# Patient Record
Sex: FEMALE | Race: WHITE | NOT HISPANIC OR LATINO
[De-identification: names, ages, dates, MRNs, and addresses within clinical notes are randomized per-mention and may not be internally consistent; named-entity substitution may affect disease eponyms.]

---

## 2017-02-03 ENCOUNTER — APPOINTMENT (OUTPATIENT)
Dept: FAMILY MEDICINE | Facility: CLINIC | Age: 22
End: 2017-02-03

## 2017-02-03 VITALS
OXYGEN SATURATION: 99 % | DIASTOLIC BLOOD PRESSURE: 80 MMHG | HEART RATE: 97 BPM | BODY MASS INDEX: 39.14 KG/M2 | SYSTOLIC BLOOD PRESSURE: 122 MMHG | WEIGHT: 289 LBS | HEIGHT: 72 IN

## 2017-06-07 ENCOUNTER — RX RENEWAL (OUTPATIENT)
Age: 22
End: 2017-06-07

## 2017-06-14 ENCOUNTER — MEDICATION RENEWAL (OUTPATIENT)
Age: 22
End: 2017-06-14

## 2017-08-11 ENCOUNTER — APPOINTMENT (OUTPATIENT)
Dept: FAMILY MEDICINE | Facility: CLINIC | Age: 22
End: 2017-08-11

## 2017-12-15 ENCOUNTER — APPOINTMENT (OUTPATIENT)
Dept: FAMILY MEDICINE | Facility: CLINIC | Age: 22
End: 2017-12-15

## 2018-08-25 ENCOUNTER — TRANSCRIPTION ENCOUNTER (OUTPATIENT)
Age: 23
End: 2018-08-25

## 2019-05-11 ENCOUNTER — APPOINTMENT (OUTPATIENT)
Dept: OBGYN | Facility: CLINIC | Age: 24
End: 2019-05-11

## 2019-06-03 ENCOUNTER — LABORATORY RESULT (OUTPATIENT)
Age: 24
End: 2019-06-03

## 2019-06-03 ENCOUNTER — APPOINTMENT (OUTPATIENT)
Dept: FAMILY MEDICINE | Facility: CLINIC | Age: 24
End: 2019-06-03
Payer: COMMERCIAL

## 2019-06-03 VITALS
SYSTOLIC BLOOD PRESSURE: 132 MMHG | HEART RATE: 72 BPM | WEIGHT: 288 LBS | BODY MASS INDEX: 39.01 KG/M2 | OXYGEN SATURATION: 98 % | HEIGHT: 72 IN | DIASTOLIC BLOOD PRESSURE: 78 MMHG

## 2019-06-03 PROCEDURE — G0444 DEPRESSION SCREEN ANNUAL: CPT

## 2019-06-03 PROCEDURE — 36415 COLL VENOUS BLD VENIPUNCTURE: CPT

## 2019-06-03 PROCEDURE — G0447 BEHAVIOR COUNSEL OBESITY 15M: CPT

## 2019-06-03 PROCEDURE — 99395 PREV VISIT EST AGE 18-39: CPT | Mod: 25

## 2019-06-03 RX ORDER — DOXYCYCLINE HYCLATE 50 MG/1
50 TABLET, DELAYED RELEASE ORAL
Refills: 0 | Status: COMPLETED | COMMUNITY

## 2019-06-04 LAB
25(OH)D3 SERPL-MCNC: 19.6 NG/ML
ALBUMIN SERPL ELPH-MCNC: 4.1 G/DL
ALP BLD-CCNC: 74 U/L
ALT SERPL-CCNC: 16 U/L
ANION GAP SERPL CALC-SCNC: 10 MMOL/L
AST SERPL-CCNC: 15 U/L
BASOPHILS # BLD AUTO: 0.02 K/UL
BASOPHILS NFR BLD AUTO: 0.3 %
BILIRUB SERPL-MCNC: 0.3 MG/DL
BUN SERPL-MCNC: 8 MG/DL
CALCIUM SERPL-MCNC: 9.7 MG/DL
CHLORIDE SERPL-SCNC: 108 MMOL/L
CHOLEST SERPL-MCNC: 144 MG/DL
CHOLEST/HDLC SERPL: 2.6 RATIO
CO2 SERPL-SCNC: 23 MMOL/L
CREAT SERPL-MCNC: 0.65 MG/DL
EOSINOPHIL # BLD AUTO: 0.12 K/UL
EOSINOPHIL NFR BLD AUTO: 2.1 %
ESTIMATED AVERAGE GLUCOSE: 91 MG/DL
GLUCOSE SERPL-MCNC: 89 MG/DL
HBA1C MFR BLD HPLC: 4.8 %
HCT VFR BLD CALC: 39.4 %
HDLC SERPL-MCNC: 55 MG/DL
HGB BLD-MCNC: 12.2 G/DL
IMM GRANULOCYTES NFR BLD AUTO: 0.3 %
INSULIN SERPL-MCNC: 13.3 UU/ML
LDLC SERPL CALC-MCNC: 77 MG/DL
LYMPHOCYTES # BLD AUTO: 1.52 K/UL
LYMPHOCYTES NFR BLD AUTO: 26 %
MAN DIFF?: NORMAL
MCHC RBC-ENTMCNC: 27.8 PG
MCHC RBC-ENTMCNC: 31 GM/DL
MCV RBC AUTO: 89.7 FL
MONOCYTES # BLD AUTO: 0.38 K/UL
MONOCYTES NFR BLD AUTO: 6.5 %
NEUTROPHILS # BLD AUTO: 3.78 K/UL
NEUTROPHILS NFR BLD AUTO: 64.8 %
PLATELET # BLD AUTO: 268 K/UL
POTASSIUM SERPL-SCNC: 4 MMOL/L
PROT SERPL-MCNC: 6.8 G/DL
RBC # BLD: 4.39 M/UL
RBC # FLD: 13.2 %
SODIUM SERPL-SCNC: 141 MMOL/L
T3 SERPL-MCNC: 122 NG/DL
T3FREE SERPL-MCNC: 3.29 PG/ML
T3RU NFR SERPL: 1 TBI
T4 FREE SERPL-MCNC: 1.1 NG/DL
T4 SERPL-MCNC: 5.6 UG/DL
THYROGLOB AB SERPL-ACNC: <20 IU/ML
THYROPEROXIDASE AB SERPL IA-ACNC: <10 IU/ML
TRIGL SERPL-MCNC: 59 MG/DL
TSH SERPL-ACNC: 0.95 UIU/ML
URATE SERPL-MCNC: 4.4 MG/DL
WBC # FLD AUTO: 5.84 K/UL

## 2019-06-04 NOTE — PHYSICAL EXAM
[No Acute Distress] : no acute distress [Well Nourished] : well nourished [Well Developed] : well developed [Well-Appearing] : well-appearing [Normal Sclera/Conjunctiva] : normal sclera/conjunctiva [PERRL] : pupils equal round and reactive to light [EOMI] : extraocular movements intact [Normal Outer Ear/Nose] : the outer ears and nose were normal in appearance [Normal Oropharynx] : the oropharynx was normal [No JVD] : no jugular venous distention [Supple] : supple [Thyroid Normal, No Nodules] : the thyroid was normal and there were no nodules present [No Lymphadenopathy] : no lymphadenopathy [No Respiratory Distress] : no respiratory distress  [Clear to Auscultation] : lungs were clear to auscultation bilaterally [No Accessory Muscle Use] : no accessory muscle use [Regular Rhythm] : with a regular rhythm [Normal Rate] : normal rate  [Normal S1, S2] : normal S1 and S2 [No Murmur] : no murmur heard [No Abdominal Bruit] : a ~M bruit was not heard ~T in the abdomen [No Carotid Bruits] : no carotid bruits [No Varicosities] : no varicosities [Pedal Pulses Present] : the pedal pulses are present [No Edema] : there was no peripheral edema [No Extremity Clubbing/Cyanosis] : no extremity clubbing/cyanosis [Soft] : abdomen soft [No Palpable Aorta] : no palpable aorta [Non-distended] : non-distended [Non Tender] : non-tender [No Masses] : no abdominal mass palpated [Normal Posterior Cervical Nodes] : no posterior cervical lymphadenopathy [Normal Bowel Sounds] : normal bowel sounds [No HSM] : no HSM [Normal Anterior Cervical Nodes] : no anterior cervical lymphadenopathy [No Spinal Tenderness] : no spinal tenderness [No CVA Tenderness] : no CVA  tenderness [No Joint Swelling] : no joint swelling [Grossly Normal Strength/Tone] : grossly normal strength/tone [Normal Gait] : normal gait [Coordination Grossly Intact] : coordination grossly intact [No Rash] : no rash [Deep Tendon Reflexes (DTR)] : deep tendon reflexes were 2+ and symmetric [No Focal Deficits] : no focal deficits [Normal Affect] : the affect was normal [Normal Insight/Judgement] : insight and judgment were intact

## 2019-06-04 NOTE — HISTORY OF PRESENT ILLNESS
[de-identified] : saw gyn last year\par normal  periods\par no birth control\par \par  [FreeTextEntry1] : CPE

## 2019-06-04 NOTE — COUNSELING
[Healthy eating counseling provided] : healthy eating [Weight management counseling provided] : Weight management [Low Fat Diet] : Low fat diet [Activity counseling provided] : activity [Decrease Portions] : Decrease food portions [ - Behavioral Counseling for Obesity (Face-to-Face for 15 Minutes)] : Behavioral Counseling for Obesity (Face-to-Face for 15 Minutes) [ - Annual Depression Screening] : Annual Depression Screening

## 2019-06-04 NOTE — HEALTH RISK ASSESSMENT
[Good] : ~his/her~  mood as  good [0] : 1) Little interest or pleasure doing things: Not at all (0) [] : No [de-identified] : social 2-3 drinkls per week [[id number]] : 0

## 2019-06-04 NOTE — ASSESSMENT
[FreeTextEntry1] : >10 minutes spent on obesity counseling\par \par recommend nutriotion consult\par low carb diet\par continue exercise\par follow up in three motnhs\par \par r/o PCOs \par consider metformin/endo consult

## 2019-06-04 NOTE — HISTORY OF PRESENT ILLNESS
[de-identified] : saw gyn last year\par normal  periods\par no birth control\par \par  [FreeTextEntry1] : CPE

## 2019-06-04 NOTE — PHYSICAL EXAM
[No Acute Distress] : no acute distress [Well Nourished] : well nourished [Well Developed] : well developed [Well-Appearing] : well-appearing [Normal Sclera/Conjunctiva] : normal sclera/conjunctiva [PERRL] : pupils equal round and reactive to light [EOMI] : extraocular movements intact [Normal Outer Ear/Nose] : the outer ears and nose were normal in appearance [Normal Oropharynx] : the oropharynx was normal [No JVD] : no jugular venous distention [Supple] : supple [No Lymphadenopathy] : no lymphadenopathy [Thyroid Normal, No Nodules] : the thyroid was normal and there were no nodules present [Clear to Auscultation] : lungs were clear to auscultation bilaterally [No Respiratory Distress] : no respiratory distress  [No Accessory Muscle Use] : no accessory muscle use [Regular Rhythm] : with a regular rhythm [Normal Rate] : normal rate  [Normal S1, S2] : normal S1 and S2 [No Murmur] : no murmur heard [No Carotid Bruits] : no carotid bruits [No Abdominal Bruit] : a ~M bruit was not heard ~T in the abdomen [No Varicosities] : no varicosities [No Edema] : there was no peripheral edema [Pedal Pulses Present] : the pedal pulses are present [No Extremity Clubbing/Cyanosis] : no extremity clubbing/cyanosis [No Palpable Aorta] : no palpable aorta [Soft] : abdomen soft [Non-distended] : non-distended [Non Tender] : non-tender [No Masses] : no abdominal mass palpated [Normal Posterior Cervical Nodes] : no posterior cervical lymphadenopathy [No HSM] : no HSM [Normal Bowel Sounds] : normal bowel sounds [Normal Anterior Cervical Nodes] : no anterior cervical lymphadenopathy [No CVA Tenderness] : no CVA  tenderness [No Spinal Tenderness] : no spinal tenderness [No Joint Swelling] : no joint swelling [Grossly Normal Strength/Tone] : grossly normal strength/tone [Normal Gait] : normal gait [No Rash] : no rash [Coordination Grossly Intact] : coordination grossly intact [No Focal Deficits] : no focal deficits [Deep Tendon Reflexes (DTR)] : deep tendon reflexes were 2+ and symmetric [Normal Affect] : the affect was normal [Normal Insight/Judgement] : insight and judgment were intact

## 2019-06-04 NOTE — HEALTH RISK ASSESSMENT
[Good] : ~his/her~ current health as good [0] : 1) Little interest or pleasure doing things: Not at all (0) [] : No [de-identified] : social 2-3 drinkls per week [[id number]] : 0

## 2019-06-04 NOTE — COUNSELING
[Weight management counseling provided] : Weight management [Healthy eating counseling provided] : healthy eating [Activity counseling provided] : activity [Low Fat Diet] : Low fat diet [Decrease Portions] : Decrease food portions [ - Behavioral Counseling for Obesity (Face-to-Face for 15 Minutes)] : Behavioral Counseling for Obesity (Face-to-Face for 15 Minutes) [ - Annual Depression Screening] : Annual Depression Screening

## 2019-06-06 LAB
TESTOST BND SERPL-MCNC: 0.9 PG/ML
TESTOST SERPL-MCNC: 33.7 NG/DL

## 2019-06-07 ENCOUNTER — RX CHANGE (OUTPATIENT)
Age: 24
End: 2019-06-07

## 2019-06-07 ENCOUNTER — RX RENEWAL (OUTPATIENT)
Age: 24
End: 2019-06-07

## 2019-06-07 RX ORDER — NALTREXONE HYDROCHLORIDE AND BUPROPION HYDROCHLORIDE 8; 90 MG/1; MG/1
8-90 TABLET, EXTENDED RELEASE ORAL
Qty: 120 | Refills: 3 | Status: DISCONTINUED | COMMUNITY
Start: 2019-06-07 | End: 2019-06-07

## 2019-06-24 ENCOUNTER — RX RENEWAL (OUTPATIENT)
Age: 24
End: 2019-06-24

## 2019-06-26 ENCOUNTER — RX RENEWAL (OUTPATIENT)
Age: 24
End: 2019-06-26

## 2019-07-18 ENCOUNTER — RECORD ABSTRACTING (OUTPATIENT)
Age: 24
End: 2019-07-18

## 2019-07-18 DIAGNOSIS — Z80.7 FAMILY HISTORY OF OTHER MALIGNANT NEOPLASMS OF LYMPHOID, HEMATOPOIETIC AND RELATED TISSUES: ICD-10-CM

## 2019-07-18 DIAGNOSIS — N94.3 PREMENSTRUAL TENSION SYNDROME: ICD-10-CM

## 2019-07-18 LAB — CYTOLOGY CVX/VAG DOC THIN PREP: NORMAL

## 2019-07-19 ENCOUNTER — APPOINTMENT (OUTPATIENT)
Dept: OBGYN | Facility: CLINIC | Age: 24
End: 2019-07-19
Payer: COMMERCIAL

## 2019-07-19 VITALS
WEIGHT: 281 LBS | DIASTOLIC BLOOD PRESSURE: 74 MMHG | HEIGHT: 72 IN | BODY MASS INDEX: 38.06 KG/M2 | SYSTOLIC BLOOD PRESSURE: 136 MMHG

## 2019-07-19 DIAGNOSIS — L73.2 HIDRADENITIS SUPPURATIVA: ICD-10-CM

## 2019-07-19 LAB
BILIRUB UR QL STRIP: NORMAL
CLARITY UR: CLEAR
COLLECTION METHOD: NORMAL
GLUCOSE UR-MCNC: NORMAL
HCG UR QL: 0.2 EU/DL
HCG UR QL: NEGATIVE
HGB UR QL STRIP.AUTO: NORMAL
KETONES UR-MCNC: NORMAL
LEUKOCYTE ESTERASE UR QL STRIP: NORMAL
NITRITE UR QL STRIP: NORMAL
PH UR STRIP: 6
PROT UR STRIP-MCNC: NORMAL
QUALITY CONTROL: YES
SP GR UR STRIP: 1.02

## 2019-07-19 PROCEDURE — 81003 URINALYSIS AUTO W/O SCOPE: CPT | Mod: QW

## 2019-07-19 PROCEDURE — 81025 URINE PREGNANCY TEST: CPT

## 2019-07-19 PROCEDURE — 99395 PREV VISIT EST AGE 18-39: CPT

## 2019-07-19 NOTE — HISTORY OF PRESENT ILLNESS
[Last Pap ___] : Last cervical pap smear was [unfilled] [Reproductive Age] : is of reproductive age [Sexually Active] : is sexually active [Monogamous] : is monogamous [Contraception] : uses contraception [Condoms] : uses condoms [Male ___] : [unfilled] male [Definite:  ___ (Date)] : the last menstrual period was [unfilled] [Menarche Age: ____] : age at menarche was [unfilled] [de-identified] : BRUS: 12/08/15 B2, PUS: 08/31/17

## 2019-07-19 NOTE — DISCUSSION/SUMMARY
[FreeTextEntry1] : 1. pt with probable pcos- managed currently with exercise and diet.  she has lost some weight.\par 2. start ocps. ACHES and correct use reviewed\par 3. encouraged continued weight loss. consider seeing nutritionist.  \par 4. hydradenitis relief measures discussed.

## 2019-07-19 NOTE — PHYSICAL EXAM
[Awake] : awake [Alert] : alert [Mass] : no breast mass [Nipple Discharge] : no nipple discharge [Axillary LAD] : no axillary lymphadenopathy [Soft] : soft [Tender] : non tender [Distended] : not distended [Oriented x3] : oriented to person, place, and time [Normal] : uterus [Motion Tenderness] : there was no cervical motion tenderness [Tenderness] : nontender [Enlarged ___ wks] : not enlarged [Mass ___ cm] : no uterine mass was palpated [Uterine Adnexae] : were not tender and not enlarged [de-identified] : multiple areas of folliculitis in various stages of healing.

## 2019-07-22 LAB
C TRACH RRNA SPEC QL NAA+PROBE: NOT DETECTED
N GONORRHOEA RRNA SPEC QL NAA+PROBE: NOT DETECTED
SOURCE AMPLIFICATION: NORMAL
SOURCE TP AMPLIFICATION: NORMAL
T VAGINALIS RRNA SPEC QL NAA+PROBE: NOT DETECTED

## 2019-07-26 LAB — CYTOLOGY CVX/VAG DOC THIN PREP: NORMAL

## 2019-07-29 ENCOUNTER — RX RENEWAL (OUTPATIENT)
Age: 24
End: 2019-07-29

## 2019-07-30 ENCOUNTER — TRANSCRIPTION ENCOUNTER (OUTPATIENT)
Age: 24
End: 2019-07-30

## 2019-09-03 ENCOUNTER — APPOINTMENT (OUTPATIENT)
Dept: FAMILY MEDICINE | Facility: CLINIC | Age: 24
End: 2019-09-03
Payer: COMMERCIAL

## 2019-09-03 VITALS
BODY MASS INDEX: 38.06 KG/M2 | SYSTOLIC BLOOD PRESSURE: 126 MMHG | DIASTOLIC BLOOD PRESSURE: 74 MMHG | WEIGHT: 281 LBS | HEIGHT: 72 IN | HEART RATE: 90 BPM | OXYGEN SATURATION: 98 %

## 2019-09-03 PROCEDURE — G0447 BEHAVIOR COUNSEL OBESITY 15M: CPT

## 2019-09-03 PROCEDURE — 99214 OFFICE O/P EST MOD 30 MIN: CPT | Mod: 25

## 2019-09-03 RX ORDER — LIRAGLUTIDE 6 MG/ML
18 INJECTION, SOLUTION SUBCUTANEOUS DAILY
Qty: 1 | Refills: 3 | Status: DISCONTINUED | COMMUNITY
Start: 2019-06-07 | End: 2019-09-03

## 2019-09-03 NOTE — COUNSELING
[Potential consequences of obesity discussed] : Potential consequences of obesity discussed [Benefits of weight loss discussed] : Benefits of weight loss discussed [Encouraged to increase physical activity] : Encouraged to increase physical activity [Decrease Portions] : decrease portions [Good understanding] : Patient has a good understanding of disease, goals and obesity follow-up plan [FreeTextEntry4] : 12

## 2019-09-03 NOTE — HISTORY OF PRESENT ILLNESS
[FreeTextEntry1] : follow up on ADD/weight  [de-identified] : ADALBERTO is a 23 year female here for f/u. She reports that she has lost 8 lbs in three months. She has decreased carb intake/ increased protein intake. Increased physical activity.  Pt reports that at her last GYN appointment, she was dx with PCOS. Currently taking Concerta 54 mg and would like to increase the dose. Pt reports that she is uninterested in weight loss drugs. \par

## 2019-09-03 NOTE — PLAN
[FreeTextEntry1] : - Metformin 500 mg ordered \par - Concerta refill ordered \par - Concerta increased to 72 mg\par - Follow up in three months

## 2019-09-03 NOTE — END OF VISIT
[FreeTextEntry3] : Medical record entries made by the scribe today today, were at my direction and personally dictated to them by me, Dr. Nya Bone on Sep 03, 2019. I have reviewed the chart and agree that the record accurately reflects my personal performance of the history, physical exam, assessment, and plan.\par \par

## 2019-09-03 NOTE — PHYSICAL EXAM
[No Acute Distress] : no acute distress [Well Nourished] : well nourished [Well-Appearing] : well-appearing [Well Developed] : well developed [Normal Sclera/Conjunctiva] : normal sclera/conjunctiva [EOMI] : extraocular movements intact [PERRL] : pupils equal round and reactive to light [Normal Outer Ear/Nose] : the outer ears and nose were normal in appearance [Normal Oropharynx] : the oropharynx was normal [No JVD] : no jugular venous distention [No Lymphadenopathy] : no lymphadenopathy [Thyroid Normal, No Nodules] : the thyroid was normal and there were no nodules present [Supple] : supple [No Respiratory Distress] : no respiratory distress  [Clear to Auscultation] : lungs were clear to auscultation bilaterally [No Accessory Muscle Use] : no accessory muscle use [Normal Rate] : normal rate  [Regular Rhythm] : with a regular rhythm [Normal S1, S2] : normal S1 and S2 [No Murmur] : no murmur heard [No Carotid Bruits] : no carotid bruits [No Abdominal Bruit] : a ~M bruit was not heard ~T in the abdomen [No Varicosities] : no varicosities [Pedal Pulses Present] : the pedal pulses are present [No Edema] : there was no peripheral edema [No Extremity Clubbing/Cyanosis] : no extremity clubbing/cyanosis [Soft] : abdomen soft [No Palpable Aorta] : no palpable aorta [Non Tender] : non-tender [Non-distended] : non-distended [No Masses] : no abdominal mass palpated [No HSM] : no HSM [Normal Bowel Sounds] : normal bowel sounds [Normal Posterior Cervical Nodes] : no posterior cervical lymphadenopathy [Normal Anterior Cervical Nodes] : no anterior cervical lymphadenopathy [No CVA Tenderness] : no CVA  tenderness [No Spinal Tenderness] : no spinal tenderness [No Joint Swelling] : no joint swelling [Grossly Normal Strength/Tone] : grossly normal strength/tone [No Rash] : no rash [Coordination Grossly Intact] : coordination grossly intact [No Focal Deficits] : no focal deficits [Normal Gait] : normal gait [Deep Tendon Reflexes (DTR)] : deep tendon reflexes were 2+ and symmetric [Normal Affect] : the affect was normal [Normal Insight/Judgement] : insight and judgment were intact

## 2019-09-03 NOTE — ADDENDUM
[FreeTextEntry1] : I, Shweta Rosales acting as a scribe for Dr. Nya Bone on Sep 03, 2019  at 10:44 AM\par

## 2019-10-02 ENCOUNTER — TRANSCRIPTION ENCOUNTER (OUTPATIENT)
Age: 24
End: 2019-10-02

## 2019-10-02 ENCOUNTER — RX RENEWAL (OUTPATIENT)
Age: 24
End: 2019-10-02

## 2019-11-18 ENCOUNTER — APPOINTMENT (OUTPATIENT)
Dept: FAMILY MEDICINE | Facility: CLINIC | Age: 24
End: 2019-11-18
Payer: COMMERCIAL

## 2019-11-18 VITALS
DIASTOLIC BLOOD PRESSURE: 74 MMHG | HEIGHT: 72 IN | OXYGEN SATURATION: 98 % | WEIGHT: 281 LBS | BODY MASS INDEX: 38.06 KG/M2 | SYSTOLIC BLOOD PRESSURE: 124 MMHG | HEART RATE: 88 BPM

## 2019-11-18 PROCEDURE — 99214 OFFICE O/P EST MOD 30 MIN: CPT

## 2019-11-18 RX ORDER — METFORMIN ER 500 MG 500 MG/1
500 TABLET ORAL
Qty: 90 | Refills: 1 | Status: DISCONTINUED | COMMUNITY
Start: 2019-09-03 | End: 2019-11-18

## 2019-11-18 NOTE — PHYSICAL EXAM
[No Acute Distress] : no acute distress [Well Nourished] : well nourished [Well Developed] : well developed [Well-Appearing] : well-appearing [Normal Sclera/Conjunctiva] : normal sclera/conjunctiva [PERRL] : pupils equal round and reactive to light [EOMI] : extraocular movements intact [Normal Outer Ear/Nose] : the outer ears and nose were normal in appearance [Normal Oropharynx] : the oropharynx was normal [No JVD] : no jugular venous distention [No Lymphadenopathy] : no lymphadenopathy [Supple] : supple [Thyroid Normal, No Nodules] : the thyroid was normal and there were no nodules present [No Respiratory Distress] : no respiratory distress  [No Accessory Muscle Use] : no accessory muscle use [Clear to Auscultation] : lungs were clear to auscultation bilaterally [Normal Rate] : normal rate  [Regular Rhythm] : with a regular rhythm [Normal S1, S2] : normal S1 and S2 [No Murmur] : no murmur heard [No Carotid Bruits] : no carotid bruits [No Abdominal Bruit] : a ~M bruit was not heard ~T in the abdomen [No Varicosities] : no varicosities [Pedal Pulses Present] : the pedal pulses are present [No Edema] : there was no peripheral edema [No Palpable Aorta] : no palpable aorta [No Extremity Clubbing/Cyanosis] : no extremity clubbing/cyanosis [Soft] : abdomen soft [Non-distended] : non-distended [Non Tender] : non-tender [No Masses] : no abdominal mass palpated [No HSM] : no HSM [Normal Bowel Sounds] : normal bowel sounds [Normal Posterior Cervical Nodes] : no posterior cervical lymphadenopathy [Normal Anterior Cervical Nodes] : no anterior cervical lymphadenopathy [No CVA Tenderness] : no CVA  tenderness [No Spinal Tenderness] : no spinal tenderness [No Joint Swelling] : no joint swelling [Grossly Normal Strength/Tone] : grossly normal strength/tone [No Rash] : no rash [No Focal Deficits] : no focal deficits [Coordination Grossly Intact] : coordination grossly intact [Normal Gait] : normal gait [Deep Tendon Reflexes (DTR)] : deep tendon reflexes were 2+ and symmetric [Normal Affect] : the affect was normal [Normal Insight/Judgement] : insight and judgment were intact

## 2019-11-18 NOTE — PLAN
[FreeTextEntry1] : - Concerta refills ordered \par - Encouraged to use [facility] for Contrave and call office \par - Encouraged to call insurance and find weight management providers \par - Avoid using cotton swabs\par - Use Flonase daily

## 2019-11-18 NOTE — HISTORY OF PRESENT ILLNESS
[FreeTextEntry1] : follow up on ADD/weight check  [de-identified] : ADALBERTO is a 24 year female here for f/u. Pt c/o earache b/l. Reports RT ear to be more bothersome. She is also c/o head congestion. Pt reports she was on Metformin 2 months ago and had reoccurring GI upset. Reports she has lost 2-3 lbs since September which she is not happy about. She is very frustrated as she feels she is doing everything possible to lose weight. She is cycling x5 week, increased walking. She is also taking Concerta 72 mg daily. Following up with endocrinologist 1/20.

## 2019-11-18 NOTE — END OF VISIT
[FreeTextEntry3] : Medical record entries made by the scribe today today, were at my direction and personally dictated to them by me, Dr. Nay Bone on Nov 18, 2019. I have reviewed the chart and agree that the record accurately reflects my personal performance of the history, physical exam, assessment, and plan.\par \par

## 2019-11-18 NOTE — ADDENDUM
[FreeTextEntry1] : I, Shweta Rosales acting as a scribe for Dr. Nya Bone on Nov 18, 2019  at 9:55 AM\par

## 2019-12-17 ENCOUNTER — RX RENEWAL (OUTPATIENT)
Age: 24
End: 2019-12-17

## 2019-12-17 ENCOUNTER — TRANSCRIPTION ENCOUNTER (OUTPATIENT)
Age: 24
End: 2019-12-17

## 2020-01-06 ENCOUNTER — APPOINTMENT (OUTPATIENT)
Dept: ENDOCRINOLOGY | Facility: CLINIC | Age: 25
End: 2020-01-06
Payer: COMMERCIAL

## 2020-01-06 VITALS
SYSTOLIC BLOOD PRESSURE: 112 MMHG | WEIGHT: 280 LBS | DIASTOLIC BLOOD PRESSURE: 70 MMHG | HEIGHT: 72 IN | BODY MASS INDEX: 37.93 KG/M2 | HEART RATE: 104 BPM | OXYGEN SATURATION: 99 %

## 2020-01-06 DIAGNOSIS — Z83.3 FAMILY HISTORY OF DIABETES MELLITUS: ICD-10-CM

## 2020-01-06 DIAGNOSIS — Z83.49 FAMILY HISTORY OF OTHER ENDOCRINE, NUTRITIONAL AND METABOLIC DISEASES: ICD-10-CM

## 2020-01-06 DIAGNOSIS — E66.9 OBESITY, UNSPECIFIED: ICD-10-CM

## 2020-01-06 PROCEDURE — 99205 OFFICE O/P NEW HI 60 MIN: CPT

## 2020-01-06 RX ORDER — DESOGESTREL/ETHINYL ESTRADIOL AND ETHINYL ESTRADIOL 21-5 (28)
0.15-0.02/0.01 KIT ORAL DAILY
Qty: 3 | Refills: 3 | Status: DISCONTINUED | COMMUNITY
Start: 2019-07-19 | End: 2020-01-06

## 2020-01-06 NOTE — REVIEW OF SYSTEMS
[Back Pain] : back pain [Acne] : acne [Recent Weight Loss (___ Lbs)] : no recent weight loss [Blurry Vision] : no blurred vision [Palpitations] : no palpitations [Shortness Of Breath] : no shortness of breath [Constipation] : no constipation [Diarrhea] : no diarrhea [Irregular Menses] : regular menses [Muscle Cramps] : no muscle cramps [Hair Loss] : no hair loss [Pain/Numbness of Digits] : no pain/numbness of digits [Depression] : no depression [Anxiety] : no anxiety [Polydipsia] : no polydipsia [Easy Bruising] : no tendency for easy bruising

## 2020-01-06 NOTE — REASON FOR VISIT
[Consultation] : a consultation visit [FreeTextEntry1] : Referred here by Dr. Bone for the management of PCOS, obesity

## 2020-01-06 NOTE — CONSULT LETTER
[Dear  ___] : Dear  [unfilled], [Consult Letter:] : I had the pleasure of evaluating your patient, [unfilled]. [Please see my note below.] : Please see my note below. [Sincerely,] : Sincerely, [Consult Closing:] : Thank you very much for allowing me to participate in the care of this patient.  If you have any questions, please do not hesitate to contact me. [FreeTextEntry3] : Joey Alvarez MD, FACE\par

## 2020-01-06 NOTE — ASSESSMENT
[FreeTextEntry1] : 24 year old female with PCOS, Vitamin D deficiency and obesity, not responding to lifestyle modification. \par \par 1.  PCOS-  symptoms seem well controlled without pharmacologic therapy now.  Check baseline hormonal profile.  Encouraged patient to continue efforts at weight loss as this will likely improve symptoms further.  \par 2.  Vitamin D deficiency- start vitamin D3 2000 units daily.\par 3.  Obesity-  recommended weight watchers.  List of FDA approved weight loss agents provided to patient.  Patient thinks that Saxenda may be covered, so will send Rx.  Reviewed dose titration schedule at this visit.  Also recommended consideration of bariatric surgery consult, although based on BMI, she may not qualify.

## 2020-01-14 LAB
17OHP SERPL-MCNC: 97 NG/DL
25(OH)D3 SERPL-MCNC: 17 NG/ML
ALBUMIN SERPL ELPH-MCNC: 4.3 G/DL
ALP BLD-CCNC: 77 U/L
ALT SERPL-CCNC: 16 U/L
ANION GAP SERPL CALC-SCNC: 13 MMOL/L
AST SERPL-CCNC: 16 U/L
BASOPHILS # BLD AUTO: 0.03 K/UL
BASOPHILS NFR BLD AUTO: 0.4 %
BILIRUB SERPL-MCNC: 0.3 MG/DL
BUN SERPL-MCNC: 11 MG/DL
CALCIUM SERPL-MCNC: 10.3 MG/DL
CHLORIDE SERPL-SCNC: 103 MMOL/L
CHOLEST SERPL-MCNC: 168 MG/DL
CHOLEST/HDLC SERPL: 2.7 RATIO
CO2 SERPL-SCNC: 23 MMOL/L
CREAT SERPL-MCNC: 0.71 MG/DL
DHEA-S SERPL-MCNC: 156 UG/DL
EOSINOPHIL # BLD AUTO: 0.29 K/UL
EOSINOPHIL NFR BLD AUTO: 3.5 %
ESTIMATED AVERAGE GLUCOSE: 97 MG/DL
GLUCOSE SERPL-MCNC: 89 MG/DL
HBA1C MFR BLD HPLC: 5 %
HCT VFR BLD CALC: 41 %
HDLC SERPL-MCNC: 63 MG/DL
HGB BLD-MCNC: 13.1 G/DL
IMM GRANULOCYTES NFR BLD AUTO: 0.2 %
LDLC SERPL CALC-MCNC: 90 MG/DL
LYMPHOCYTES # BLD AUTO: 1.84 K/UL
LYMPHOCYTES NFR BLD AUTO: 22.3 %
MAN DIFF?: NORMAL
MCHC RBC-ENTMCNC: 28.3 PG
MCHC RBC-ENTMCNC: 32 GM/DL
MCV RBC AUTO: 88.6 FL
MONOCYTES # BLD AUTO: 0.53 K/UL
MONOCYTES NFR BLD AUTO: 6.4 %
NEUTROPHILS # BLD AUTO: 5.55 K/UL
NEUTROPHILS NFR BLD AUTO: 67.2 %
PLATELET # BLD AUTO: 302 K/UL
POTASSIUM SERPL-SCNC: 4.1 MMOL/L
PROLACTIN SERPL-MCNC: 17.1 NG/ML
PROT SERPL-MCNC: 6.9 G/DL
RBC # BLD: 4.63 M/UL
RBC # FLD: 13.1 %
SODIUM SERPL-SCNC: 139 MMOL/L
T4 FREE SERPL-MCNC: 1.2 NG/DL
TESTOST BND SERPL-MCNC: 1.4 PG/ML
TESTOST SERPL-MCNC: 45.9 NG/DL
TRIGL SERPL-MCNC: 76 MG/DL
TSH SERPL-ACNC: 1.03 UIU/ML
WBC # FLD AUTO: 8.26 K/UL

## 2020-01-20 ENCOUNTER — TRANSCRIPTION ENCOUNTER (OUTPATIENT)
Age: 25
End: 2020-01-20

## 2020-02-26 ENCOUNTER — TRANSCRIPTION ENCOUNTER (OUTPATIENT)
Age: 25
End: 2020-02-26

## 2020-03-09 ENCOUNTER — TRANSCRIPTION ENCOUNTER (OUTPATIENT)
Age: 25
End: 2020-03-09

## 2020-04-01 ENCOUNTER — TRANSCRIPTION ENCOUNTER (OUTPATIENT)
Age: 25
End: 2020-04-01

## 2020-04-02 ENCOUNTER — TRANSCRIPTION ENCOUNTER (OUTPATIENT)
Age: 25
End: 2020-04-02

## 2020-05-06 ENCOUNTER — TRANSCRIPTION ENCOUNTER (OUTPATIENT)
Age: 25
End: 2020-05-06

## 2020-06-09 ENCOUNTER — TRANSCRIPTION ENCOUNTER (OUTPATIENT)
Age: 25
End: 2020-06-09

## 2020-06-22 ENCOUNTER — APPOINTMENT (OUTPATIENT)
Dept: FAMILY MEDICINE | Facility: CLINIC | Age: 25
End: 2020-06-22
Payer: COMMERCIAL

## 2020-06-22 VITALS
HEIGHT: 72 IN | WEIGHT: 276 LBS | TEMPERATURE: 98.4 F | BODY MASS INDEX: 37.38 KG/M2 | OXYGEN SATURATION: 98 % | DIASTOLIC BLOOD PRESSURE: 70 MMHG | HEART RATE: 103 BPM | SYSTOLIC BLOOD PRESSURE: 118 MMHG

## 2020-06-22 PROCEDURE — 99395 PREV VISIT EST AGE 18-39: CPT | Mod: 25

## 2020-06-22 PROCEDURE — 36415 COLL VENOUS BLD VENIPUNCTURE: CPT

## 2020-06-22 PROCEDURE — G0444 DEPRESSION SCREEN ANNUAL: CPT

## 2020-06-22 RX ORDER — LIRAGLUTIDE 6 MG/ML
18 INJECTION, SOLUTION SUBCUTANEOUS DAILY
Qty: 5 | Refills: 5 | Status: COMPLETED | COMMUNITY
Start: 2020-01-06 | End: 2020-06-22

## 2020-06-22 NOTE — HEALTH RISK ASSESSMENT
[Patient reported PAP Smear was normal] : Patient reported PAP Smear was normal [Good] : ~his/her~ current health as good [None] : None [Single] : single [With Family] : lives with family [Employed] : employed [Feels Safe at Home] : Feels safe at home [Fully functional (bathing, dressing, toileting, transferring, walking, feeding)] : Fully functional (bathing, dressing, toileting, transferring, walking, feeding) [Fully functional (using the telephone, shopping, preparing meals, housekeeping, doing laundry, using] : Fully functional and needs no help or supervision to perform IADLs (using the telephone, shopping, preparing meals, housekeeping, doing laundry, using transportation, managing medications and managing finances) [PapSmearDate] : 07/19

## 2020-06-22 NOTE — PHYSICAL EXAM
Last visit - 1/20/20  F/U - Visit date not found      Patient was seen in [facility] on 2/2/20 for a reaction that she developed within 24 hours of taking the Azithromycin (See 2/2/20 encounter R/T details).  She had completed the Omnicef by that time.  They had recommended changing the antibiotic to Clindamycin and having her take Benadryl for the itching.  Denies rash now but still has the itching.  The itching is mostly to her palms, antecubitals and bottoms of feet.  She is using OTC Hydrocortisone in addition to the Benadryl.  Her sinus symptoms are improved with the Clindamycin.  Denies having problems with the new ATB.  She is wondering how long this will last and/or if there is anything else she can do.  She is taking her Singulair.  She is not taking any other antihistamines or H2 blockers.         She feels she has also developed a yeast infection.  She c/o a lot of vaginal itching and a white discharge.  She was told there was a pill you can take to help with that.        Patient's pharmacy of choice is the [facility].       Routing to Dr. Rosen for review   [No Acute Distress] : no acute distress [Well Nourished] : well nourished [Well Developed] : well developed [Well-Appearing] : well-appearing [Normal Sclera/Conjunctiva] : normal sclera/conjunctiva [PERRL] : pupils equal round and reactive to light [EOMI] : extraocular movements intact [Normal Outer Ear/Nose] : the outer ears and nose were normal in appearance [Normal Oropharynx] : the oropharynx was normal [No JVD] : no jugular venous distention [No Lymphadenopathy] : no lymphadenopathy [Supple] : supple [Thyroid Normal, No Nodules] : the thyroid was normal and there were no nodules present [No Respiratory Distress] : no respiratory distress  [No Accessory Muscle Use] : no accessory muscle use [Clear to Auscultation] : lungs were clear to auscultation bilaterally [Normal Rate] : normal rate  [Regular Rhythm] : with a regular rhythm [Normal S1, S2] : normal S1 and S2 [No Murmur] : no murmur heard [No Carotid Bruits] : no carotid bruits [No Abdominal Bruit] : a ~M bruit was not heard ~T in the abdomen [No Varicosities] : no varicosities [Pedal Pulses Present] : the pedal pulses are present [No Edema] : there was no peripheral edema [No Palpable Aorta] : no palpable aorta [No Extremity Clubbing/Cyanosis] : no extremity clubbing/cyanosis [Soft] : abdomen soft [Non Tender] : non-tender [Non-distended] : non-distended [No Masses] : no abdominal mass palpated [No HSM] : no HSM [Normal Posterior Cervical Nodes] : no posterior cervical lymphadenopathy [Normal Bowel Sounds] : normal bowel sounds [Normal Anterior Cervical Nodes] : no anterior cervical lymphadenopathy [No CVA Tenderness] : no CVA  tenderness [No Spinal Tenderness] : no spinal tenderness [No Joint Swelling] : no joint swelling [Grossly Normal Strength/Tone] : grossly normal strength/tone [No Rash] : no rash [Coordination Grossly Intact] : coordination grossly intact [No Focal Deficits] : no focal deficits [Deep Tendon Reflexes (DTR)] : deep tendon reflexes were 2+ and symmetric [Normal Affect] : the affect was normal [Normal Gait] : normal gait [Normal Insight/Judgement] : insight and judgment were intact

## 2020-06-22 NOTE — HISTORY OF PRESENT ILLNESS
[FreeTextEntry1] : CPE [de-identified] : ADALBERTO is a 24 year female here for CPE. Mood is good. [place] city but has been home for the past 3 months due to pandemic. Planning to return to help apartment next month. 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 Pt reports that she was never approved for Saxenda. States she is no longer following up with [facility] due to not having dx of PCOS. Currently taking Methylphenidate 72 mg daily. Pt reports she has been on weight watchers since January. Walking 6-9 miles daily. \par Pt reports at the end of April tested + for COVID throat swab. Loss of appetite/HA. Was tested for antibodies a few weeks later, negative. Coworker had COVID in Feb/March. \par Pt reports she developed a bump behind her LT ear a few months ago. Occasional sinus HA/pressure.

## 2020-06-22 NOTE — END OF VISIT
[FreeTextEntry3] : Medical record entries made by the scribe today today, were at my direction and personally dictated to them by me, Dr. Nya Bone on Jun 22, 2020. I have reviewed the chart and agree that the record accurately reflects my personal performance of the history, physical exam, assessment, and plan.\par \par

## 2020-06-22 NOTE — PLAN
[FreeTextEntry1] : - Blood work today \par - Consider following up with [facility] \par - Reactive lymph node on LT side, reassured pt \par - If node becomes larger than 1 cm, call office for reevaluation \par - Follow up with GYN for annual\par - Continue weight loss efforts!

## 2020-06-22 NOTE — ADDENDUM
[FreeTextEntry1] : I, Shweta Rosales acting as a scribe for Dr. Nya Bone on Jun 22, 2020  at 3:34 PM\par

## 2020-06-23 LAB
25(OH)D3 SERPL-MCNC: 31.7 NG/ML
ALBUMIN SERPL ELPH-MCNC: 4.6 G/DL
ALP BLD-CCNC: 65 U/L
ALT SERPL-CCNC: 14 U/L
ANION GAP SERPL CALC-SCNC: 12 MMOL/L
APPEARANCE: CLEAR
AST SERPL-CCNC: 19 U/L
BACTERIA: NEGATIVE
BASOPHILS # BLD AUTO: 0.04 K/UL
BASOPHILS NFR BLD AUTO: 0.5 %
BILIRUB SERPL-MCNC: 0.2 MG/DL
BILIRUBIN URINE: NEGATIVE
BLOOD URINE: NEGATIVE
BUN SERPL-MCNC: 10 MG/DL
CALCIUM SERPL-MCNC: 10 MG/DL
CHLORIDE SERPL-SCNC: 106 MMOL/L
CHOLEST SERPL-MCNC: 149 MG/DL
CHOLEST/HDLC SERPL: 2.4 RATIO
CO2 SERPL-SCNC: 22 MMOL/L
COLOR: YELLOW
CREAT SERPL-MCNC: 0.69 MG/DL
EOSINOPHIL # BLD AUTO: 0.19 K/UL
EOSINOPHIL NFR BLD AUTO: 2.5 %
ESTIMATED AVERAGE GLUCOSE: 94 MG/DL
GLUCOSE QUALITATIVE U: NEGATIVE
GLUCOSE SERPL-MCNC: 82 MG/DL
HBA1C MFR BLD HPLC: 4.9 %
HCT VFR BLD CALC: 38.3 %
HDLC SERPL-MCNC: 61 MG/DL
HGB BLD-MCNC: 12.2 G/DL
HYALINE CASTS: 3 /LPF
IMM GRANULOCYTES NFR BLD AUTO: 0.4 %
KETONES URINE: NEGATIVE
LDLC SERPL CALC-MCNC: 73 MG/DL
LEUKOCYTE ESTERASE URINE: NEGATIVE
LYMPHOCYTES # BLD AUTO: 1.84 K/UL
LYMPHOCYTES NFR BLD AUTO: 24.1 %
MAN DIFF?: NORMAL
MCHC RBC-ENTMCNC: 27.4 PG
MCHC RBC-ENTMCNC: 31.9 GM/DL
MCV RBC AUTO: 85.9 FL
MICROSCOPIC-UA: NORMAL
MONOCYTES # BLD AUTO: 0.52 K/UL
MONOCYTES NFR BLD AUTO: 6.8 %
NEUTROPHILS # BLD AUTO: 5.01 K/UL
NEUTROPHILS NFR BLD AUTO: 65.7 %
NITRITE URINE: NEGATIVE
PH URINE: 5.5
PLATELET # BLD AUTO: 303 K/UL
POTASSIUM SERPL-SCNC: 4.7 MMOL/L
PROT SERPL-MCNC: 7 G/DL
PROTEIN URINE: NEGATIVE
RBC # BLD: 4.46 M/UL
RBC # FLD: 13 %
RED BLOOD CELLS URINE: 3 /HPF
SARS-COV-2 IGG SERPL IA-ACNC: 0.37 INDEX
SARS-COV-2 IGG SERPL QL IA: NEGATIVE
SODIUM SERPL-SCNC: 140 MMOL/L
SPECIFIC GRAVITY URINE: 1.03
SQUAMOUS EPITHELIAL CELLS: 1 /HPF
TRIGL SERPL-MCNC: 76 MG/DL
TSH SERPL-ACNC: 0.95 UIU/ML
URATE SERPL-MCNC: 3.9 MG/DL
UROBILINOGEN URINE: NORMAL
WBC # FLD AUTO: 7.63 K/UL
WHITE BLOOD CELLS URINE: 1 /HPF

## 2020-07-13 ENCOUNTER — TRANSCRIPTION ENCOUNTER (OUTPATIENT)
Age: 25
End: 2020-07-13

## 2020-07-27 ENCOUNTER — APPOINTMENT (OUTPATIENT)
Dept: ENDOCRINOLOGY | Facility: CLINIC | Age: 25
End: 2020-07-27

## 2020-08-18 ENCOUNTER — TRANSCRIPTION ENCOUNTER (OUTPATIENT)
Age: 25
End: 2020-08-18

## 2020-08-27 ENCOUNTER — APPOINTMENT (OUTPATIENT)
Dept: OBGYN | Facility: CLINIC | Age: 25
End: 2020-08-27
Payer: COMMERCIAL

## 2020-08-27 VITALS
SYSTOLIC BLOOD PRESSURE: 122 MMHG | DIASTOLIC BLOOD PRESSURE: 84 MMHG | BODY MASS INDEX: 37.93 KG/M2 | HEIGHT: 72 IN | WEIGHT: 280 LBS

## 2020-08-27 DIAGNOSIS — Z87.898 PERSONAL HISTORY OF OTHER SPECIFIED CONDITIONS: ICD-10-CM

## 2020-08-27 DIAGNOSIS — Z86.39 PERSONAL HISTORY OF OTHER ENDOCRINE, NUTRITIONAL AND METABOLIC DISEASE: ICD-10-CM

## 2020-08-27 DIAGNOSIS — Z01.419 ENCOUNTER FOR GYNECOLOGICAL EXAMINATION (GENERAL) (ROUTINE) W/OUT ABNORMAL FINDINGS: ICD-10-CM

## 2020-08-27 DIAGNOSIS — F32.81 PREMENSTRUAL DYSPHORIC DISORDER: ICD-10-CM

## 2020-08-27 DIAGNOSIS — Z87.42 PERSONAL HISTORY OF OTHER DISEASES OF THE FEMALE GENITAL TRACT: ICD-10-CM

## 2020-08-27 DIAGNOSIS — Z13.6 ENCOUNTER FOR SCREENING FOR CARDIOVASCULAR DISORDERS: ICD-10-CM

## 2020-08-27 DIAGNOSIS — Z13.29 ENCOUNTER FOR SCREENING FOR OTHER SUSPECTED ENDOCRINE DISORDER: ICD-10-CM

## 2020-08-27 DIAGNOSIS — R41.840 ATTENTION AND CONCENTRATION DEFICIT: ICD-10-CM

## 2020-08-27 DIAGNOSIS — Z92.89 PERSONAL HISTORY OF OTHER MEDICAL TREATMENT: ICD-10-CM

## 2020-08-27 DIAGNOSIS — Z30.9 ENCOUNTER FOR CONTRACEPTIVE MANAGEMENT, UNSPECIFIED: ICD-10-CM

## 2020-08-27 PROCEDURE — 99395 PREV VISIT EST AGE 18-39: CPT

## 2020-08-28 LAB
C TRACH RRNA SPEC QL NAA+PROBE: NOT DETECTED
N GONORRHOEA RRNA SPEC QL NAA+PROBE: NOT DETECTED
SOURCE TP AMPLIFICATION: NORMAL

## 2020-08-31 ENCOUNTER — APPOINTMENT (OUTPATIENT)
Dept: SURGERY | Facility: CLINIC | Age: 25
End: 2020-08-31
Payer: COMMERCIAL

## 2020-08-31 VITALS
HEIGHT: 72 IN | HEART RATE: 83 BPM | DIASTOLIC BLOOD PRESSURE: 79 MMHG | WEIGHT: 281.25 LBS | TEMPERATURE: 97.6 F | OXYGEN SATURATION: 100 % | BODY MASS INDEX: 38.09 KG/M2 | SYSTOLIC BLOOD PRESSURE: 129 MMHG

## 2020-08-31 DIAGNOSIS — F51.4 SLEEP TERRORS [NIGHT TERRORS]: ICD-10-CM

## 2020-08-31 PROCEDURE — 99204 OFFICE O/P NEW MOD 45 MIN: CPT

## 2020-08-31 RX ORDER — PEN NEEDLE, DIABETIC 29 G X1/2"
31G X 5 MM NEEDLE, DISPOSABLE MISCELLANEOUS
Qty: 1 | Refills: 1 | Status: DISCONTINUED | COMMUNITY
Start: 2020-01-06 | End: 2020-08-31

## 2020-08-31 NOTE — PLAN
[FreeTextEntry1] : Referral for bariatric dietician, medical bariatrics.\par With current BMi 38, may not qualify, although she would benefit greatly with better control of her PCOS.

## 2020-08-31 NOTE — REVIEW OF SYSTEMS
[Patient Intake Form Reviewed] : Patient intake form was reviewed [Fever] : no fever [Chills] : no chills [Eye Pain] : no eye pain [Red Eyes] : eyes not red [Dysphagia] : no dysphagia [Loss of Hearing] : no loss of hearing [Mucosal Pain] : no mucosal pain [Odynophagia] : no odynophagia [Chest Pain] : no chest pain [Palpitations] : no palpitations [Leg Claudication] : no intermittent leg claudication [Shortness Of Breath] : no shortness of breath [Wheezing] : no wheezing [Cough] : no cough [Constipation] : no constipation [Vomiting] : no vomiting [Abdominal Pain] : no abdominal pain [Reflux/Heartburn] : no reflux/ heartburn [Dysuria] : no dysuria [Hernia] : no hernia [Vaginal Discharge] : no vaginal discharge [Incontinence] : no incontinence [Joint Pain] : no joint pain [Joint Stiffness] : no joint stiffness [Muscle Pain] : no muscle pain [Skin Rash] : no skin rash [Dizziness] : no dizziness [Skin Wound] : no skin wound [Confused] : no confusion [Suicidal] : not suicidal [Insomnia] : no insomnia [Anxiety] : no anxiety [Proptosis] : no proptosis [Hot Flashes] : no hot flashes [Muscle Weakness] : no muscle weakness [Easy Bleeding] : no tendency for easy bleeding [Swollen Glands] : no swollen glands [Easy Bruising] : no tendency for easy bruising

## 2020-08-31 NOTE — REASON FOR VISIT
[Initial Consult] : an initial consult for [Morbid Obesity (BMI<40)] : morbid obesity (bmi<40) [Parent] : parent

## 2020-08-31 NOTE — HISTORY OF PRESENT ILLNESS
[de-identified] : Ms. Scott is a 23 yo female h/o PCOS on metformin here today for evaluation of obesity. Has tried numerous times to lose weight through more traditional means. She has tried pharmacologic interventions, weight watchers, low carb diets, extreme fitness regimens, all without significant success. She states that she has lost, at most, 15 lbs before subsequently regained her weight. She has been trying to lose weight since her mid-teens.\par Denies EtOH/tobacco use\par No chronic NSAID use. [de-identified] : ADALBERTO ALCANTAR is a 24 year year old female with history of ADHD, PCOS as well as obesity with a BMI=39, presents today to discuss her weight loss options. She states that she has tried numerous times to lose weight through more traditional medical means (diets including Weight Watchers, [place]) with limited success. She states she loses approx. 15-20lbs each attempt before plateauing and losing interest.  Patient also has been on Contrave without any significant weight loss. She is looking for a more durable weight loss solution. She is interested in bariatric surgery. \par Denies tobacco and ETOH use. \par + GERD \par

## 2020-09-02 LAB — CYTOLOGY CVX/VAG DOC THIN PREP: NORMAL

## 2020-09-10 NOTE — PHYSICAL EXAM
[Awake] : awake [Alert] : alert [Mass] : no breast mass [Soft] : soft [Axillary LAD] : no axillary lymphadenopathy [Nipple Discharge] : no nipple discharge [Oriented x3] : oriented to person, place, and time [Distended] : not distended [Tender] : non tender [Normal] : uterus [Motion Tenderness] : there was no cervical motion tenderness [Tenderness] : nontender [Uterine Adnexae] : were not tender and not enlarged [Enlarged ___ wks] : not enlarged [Mass ___ cm] : no uterine mass was palpated

## 2020-09-10 NOTE — COUNSELING
[Breast Self Exam] : breast self exam [Nutrition] : nutrition [Vitamins/Supplements] : vitamins/supplements [Exercise] : exercise [Weight Management] : weight management [Contraception] : contraception [STD (testing, results, tx)] : STD (testing, results, tx)

## 2020-09-10 NOTE — HISTORY OF PRESENT ILLNESS
[___ Month(s) Ago] : [unfilled] month(s) ago [Good] : being in good health [Healthy Diet] : a healthy diet [Regular Exercise] : regular exercise [Last Pap ___] : Last cervical pap smear was [unfilled] [Reproductive Age] : is of reproductive age [Definite:  ___ (Date)] : the last menstrual period was [unfilled] [Sexually Active] : is sexually active [Menarche Age: ____] : age at menarche was [unfilled] [Contraception] : uses contraception [Male ___] : [unfilled] male [Condoms] : uses condoms [Pregnancy History] : denies prior pregnancies

## 2020-09-17 ENCOUNTER — TRANSCRIPTION ENCOUNTER (OUTPATIENT)
Age: 25
End: 2020-09-17

## 2020-10-19 ENCOUNTER — APPOINTMENT (OUTPATIENT)
Dept: BARIATRICS/WEIGHT MGMT | Facility: CLINIC | Age: 25
End: 2020-10-19

## 2020-10-21 ENCOUNTER — TRANSCRIPTION ENCOUNTER (OUTPATIENT)
Age: 25
End: 2020-10-21

## 2020-11-11 ENCOUNTER — TRANSCRIPTION ENCOUNTER (OUTPATIENT)
Age: 25
End: 2020-11-11

## 2020-11-24 ENCOUNTER — TRANSCRIPTION ENCOUNTER (OUTPATIENT)
Age: 25
End: 2020-11-24

## 2020-11-25 ENCOUNTER — TRANSCRIPTION ENCOUNTER (OUTPATIENT)
Age: 25
End: 2020-11-25

## 2020-12-21 ENCOUNTER — TRANSCRIPTION ENCOUNTER (OUTPATIENT)
Age: 25
End: 2020-12-21

## 2020-12-23 PROBLEM — Z01.419 ENCOUNTER FOR WELL WOMAN EXAM WITH ROUTINE GYNECOLOGICAL EXAM: Status: RESOLVED | Noted: 2020-08-27 | Resolved: 2020-12-23

## 2020-12-30 ENCOUNTER — APPOINTMENT (OUTPATIENT)
Dept: FAMILY MEDICINE | Facility: CLINIC | Age: 25
End: 2020-12-30
Payer: COMMERCIAL

## 2020-12-30 DIAGNOSIS — F90.9 ATTENTION-DEFICIT HYPERACTIVITY DISORDER, UNSPECIFIED TYPE: ICD-10-CM

## 2020-12-30 DIAGNOSIS — L70.9 ACNE, UNSPECIFIED: ICD-10-CM

## 2020-12-30 DIAGNOSIS — Z86.16 PERSONAL HISTORY OF COVID-19: ICD-10-CM

## 2020-12-30 DIAGNOSIS — J01.90 ACUTE SINUSITIS, UNSPECIFIED: ICD-10-CM

## 2020-12-30 PROCEDURE — 99214 OFFICE O/P EST MOD 30 MIN: CPT | Mod: 95

## 2020-12-30 NOTE — HISTORY OF PRESENT ILLNESS
[Home] : at home, [unfilled] , at the time of the visit. [Medical Office: ([facility])___] : at the medical office located in  [Verbal consent obtained from patient] : the patient, [unfilled] [FreeTextEntry8] : 26 yo female s/p +ve COVID-19  Dx on 12/11/20.  pt c  Hx of "bad cold"  loss of taste/smell and H/A.   ALL aforementioned symptoms have since resolved. \par \par pt now c 3-4 d hx of B/L max sinus congestion, nasal congestion c green rhinorrhea  no f/c/s no N/V/D  no abd pain \par +ve good appetite \par \par reports some relief c OTC Advil

## 2020-12-30 NOTE — PLAN
[FreeTextEntry1] : aggressive hydration!!!!!!!\par \par OTC Nasacort AQ  2 sp ea nostril qd \par \par see med orders \par \par \par pt states current medication regimen is managing condition well

## 2020-12-30 NOTE — PHYSICAL EXAM
[No Acute Distress] : no acute distress [Well Nourished] : well nourished [Well Developed] : well developed [Well-Appearing] : well-appearing [EOMI] : extraocular movements intact [Normal Outer Ear/Nose] : the outer ears and nose were normal in appearance [No JVD] : no jugular venous distention [No Respiratory Distress] : no respiratory distress  [No Accessory Muscle Use] : no accessory muscle use [Grossly Normal Strength/Tone] : grossly normal strength/tone [Coordination Grossly Intact] : coordination grossly intact [Normal Affect] : the affect was normal [Normal Mood] : the mood was normal [Normal Insight/Judgement] : insight and judgment were intact

## 2021-02-21 ENCOUNTER — TRANSCRIPTION ENCOUNTER (OUTPATIENT)
Age: 26
End: 2021-02-21

## 2021-04-05 ENCOUNTER — TRANSCRIPTION ENCOUNTER (OUTPATIENT)
Age: 26
End: 2021-04-05

## 2021-04-12 ENCOUNTER — APPOINTMENT (OUTPATIENT)
Dept: FAMILY MEDICINE | Facility: CLINIC | Age: 26
End: 2021-04-12

## 2021-04-14 ENCOUNTER — TRANSCRIPTION ENCOUNTER (OUTPATIENT)
Age: 26
End: 2021-04-14

## 2021-04-28 ENCOUNTER — TRANSCRIPTION ENCOUNTER (OUTPATIENT)
Age: 26
End: 2021-04-28

## 2021-05-13 ENCOUNTER — NON-APPOINTMENT (OUTPATIENT)
Age: 26
End: 2021-05-13

## 2021-05-14 ENCOUNTER — APPOINTMENT (OUTPATIENT)
Dept: CARDIOLOGY | Facility: CLINIC | Age: 26
End: 2021-05-14
Payer: COMMERCIAL

## 2021-05-14 ENCOUNTER — NON-APPOINTMENT (OUTPATIENT)
Age: 26
End: 2021-05-14

## 2021-05-14 VITALS
BODY MASS INDEX: 37.25 KG/M2 | DIASTOLIC BLOOD PRESSURE: 82 MMHG | WEIGHT: 275 LBS | HEIGHT: 72 IN | SYSTOLIC BLOOD PRESSURE: 124 MMHG | OXYGEN SATURATION: 94 % | HEART RATE: 92 BPM

## 2021-05-14 PROCEDURE — ZZZZZ: CPT

## 2021-05-14 PROCEDURE — 93000 ELECTROCARDIOGRAM COMPLETE: CPT | Mod: 59

## 2021-05-14 PROCEDURE — 93242 EXT ECG>48HR<7D RECORDING: CPT

## 2021-05-14 PROCEDURE — 99072 ADDL SUPL MATRL&STAF TM PHE: CPT

## 2021-05-14 PROCEDURE — 99204 OFFICE O/P NEW MOD 45 MIN: CPT

## 2021-05-14 RX ORDER — AMOXICILLIN AND CLAVULANATE POTASSIUM 875; 125 MG/1; MG/1
875-125 TABLET, COATED ORAL
Qty: 20 | Refills: 0 | Status: DISCONTINUED | COMMUNITY
Start: 2020-12-30 | End: 2021-05-14

## 2021-05-21 ENCOUNTER — NON-APPOINTMENT (OUTPATIENT)
Age: 26
End: 2021-05-21

## 2021-05-21 NOTE — CARDIOLOGY SUMMARY
Daily Progress Note: 2018 3:50 PM   Admit Date: 2017    Patient seen in follow up for multiple medical problems as listed below:  Patient Active Problem List   Diagnosis Code    Status asthmaticus J45.902    Lactic acidosis J08.9    Metabolic acidosis with respiratory alkalosis E87.2, E87.3    Acute severe exacerbation of asthma J45. 0    Acute respiratory acidosis E87.2       Assesment      31 y/o male with history of asthma. Admitted to ICU with Resp failure on BIpap due to status asthmaticus. Acute severe asthma w/ hypoxic resp failure - Cont IV steroids, nebs and wean oxygen and Bipap per pulm  Leukocytosis  Anxiety    DVT Protocol Active: yes  Code Status:  Full Code     Disposition:req 48h hosp    Subjective:     CC: Respiratory Distress    Interval History: off Bipap on 3L but had panic attack last night and required temporary bipap. Lungs unchanged today with some faint course sounds and wheezing. ROS: 11 point ROS negative     Objective:     Visit Vitals    /87    Pulse 79    Temp 97.6 °F (36.4 °C)    Resp 18    Ht 5' 8\" (1.727 m)    Wt 88.7 kg (195 lb 8.8 oz)    SpO2 95%    BMI 29.73 kg/m2       Temp (24hrs), Av.4 °F (36.3 °C), Min:96.5 °F (35.8 °C), Max:98 °F (36.7 °C)        Intake/Output Summary (Last 24 hours) at 18 1008  Last data filed at 18 0100   Gross per 24 hour   Intake             1990 ml   Output             2170 ml   Net             -180 ml       Gen: AOx3, NAD  HEENT:  GIULIA, EOMI. Neck: No Bruits/JVD   Lungs:   Faint course sounds, moderate exp wheeze. moderate respiratory effort  Heart:   RR S1 S2 without M/R/G  Abdomen: ND,NT, BSX4,   Extremities:   No LE edema. No cyanosis.   Skin:  no jaundice/lesions      Data Review:     Meds/Labs/Tests reviewed    Current Shift:     Last three shifts:   1901 -  0700  In: 3160 [P.O.:3160]  Out: 9607 [Urine:3520]  Recent Labs      18   0400  18   0330  18   1030 [de-identified] : 05/2021 NSR abnormal QRS contours WBC  19.5*  22.7*  27.5*   RBC  5.86*  6.10*  5.99*   HGB  16.7*  17.2*  17.2*   HCT  50.5*  52.6*  52.6*   PLT  312  320  373   GRANS  86*  89*   --    LYMPH  7*  5*   --    EOS  0  0   --        Recent Labs      01/04/18   0400  01/03/18   0330  01/02/18   1030   BUN  28*  33*  35*   CREA  0.84  0.98  1.05   CA  8.5  8.7  8.8   K  5.1  5.2  5.5   NA  139  139  140   CL  100  101  101   CO2  29  31  32   GLU  104*  120*  130*        Lab Results   Component Value Date/Time    Glucose 104 01/04/2018 04:00 AM    Glucose 120 01/03/2018 03:30 AM    Glucose 130 01/02/2018 10:30 AM    Glucose 122 01/01/2018 04:15 AM    Glucose 214 12/31/2017 08:37 AM          Care coordination with Nursing/Consultants/staff: 15  Prior history, labs, and charting reviewed: 15    Procedures/Imaging:  pCXR 12/31    Total time spent with chart review, patient examination/education, discussion with staff on case,documentation and medication management / adjustment  :  30 Minutes      Dr Philippe Flax  [phone]

## 2021-05-21 NOTE — DISCUSSION/SUMMARY
[FreeTextEntry1] : Pt is a 26 y/o F with palpitations/tachycardia\par Given pt's symptoms will check montes monitor to assess for ectopy.  Advised adequate hydration.  Drug use, OTC medication use, caffeine consumption reviewed\par Will check transthoracic echocardiogram to evaluate left ventricular function and assess for any structural abnormalities\par Advised pt to go to the nearest ED if symptoms persist or worsen\par VSS\par The described plan was discussed with the pt.  All questions and concerns were addressed to the best of my knowledge.

## 2021-05-21 NOTE — REVIEW OF SYSTEMS
[SOB] : no shortness of breath [Leg Claudication] : no intermittent leg claudication [Palpitations] : palpitations [Syncope] : no syncope [Negative] : Heme/Lymph

## 2021-05-21 NOTE — HISTORY OF PRESENT ILLNESS
[FreeTextEntry1] : Pt is a 26 y/o F who is referred here today by their PCP for evaluation.  For the past 2 weeks she been getting episodes of palpitaitons/high HR up to 160-180's.  \par COVID+ 12/2020, not hospitalized\par \par PMH: none\par Family hx: grandfather MI/CABG/PCI 48\par Smoking status: never\par social ETOH\par no drug use\par Current exercise: 6x/week 1.5 hrs biking/walking\par Daily water intake: 1 gallon\par Daily caffeine intake: 1 cup coffee\par OTC medications: advil PRN, MVI\par NKDA\par Previous cardiac testing: none\par Previous hospitalizations: none\par

## 2021-06-19 ENCOUNTER — TRANSCRIPTION ENCOUNTER (OUTPATIENT)
Age: 26
End: 2021-06-19

## 2021-06-20 PROCEDURE — 93244 EXT ECG>48HR<7D REV&INTERPJ: CPT

## 2021-06-29 ENCOUNTER — APPOINTMENT (OUTPATIENT)
Dept: FAMILY MEDICINE | Facility: CLINIC | Age: 26
End: 2021-06-29
Payer: COMMERCIAL

## 2021-06-29 VITALS
WEIGHT: 290 LBS | SYSTOLIC BLOOD PRESSURE: 112 MMHG | BODY MASS INDEX: 39.28 KG/M2 | TEMPERATURE: 98.6 F | HEIGHT: 72 IN | OXYGEN SATURATION: 98 % | HEART RATE: 98 BPM | DIASTOLIC BLOOD PRESSURE: 80 MMHG

## 2021-06-29 DIAGNOSIS — Z13.220 ENCOUNTER FOR SCREENING FOR LIPOID DISORDERS: ICD-10-CM

## 2021-06-29 DIAGNOSIS — Z86.16 PERSONAL HISTORY OF COVID-19: ICD-10-CM

## 2021-06-29 DIAGNOSIS — Z13.21 ENCOUNTER FOR SCREENING FOR NUTRITIONAL DISORDER: ICD-10-CM

## 2021-06-29 DIAGNOSIS — Z13.1 ENCOUNTER FOR SCREENING FOR DIABETES MELLITUS: ICD-10-CM

## 2021-06-29 DIAGNOSIS — M54.31 SCIATICA, RIGHT SIDE: ICD-10-CM

## 2021-06-29 PROCEDURE — G0444 DEPRESSION SCREEN ANNUAL: CPT | Mod: 59

## 2021-06-29 PROCEDURE — 36415 COLL VENOUS BLD VENIPUNCTURE: CPT

## 2021-06-29 PROCEDURE — G0442 ANNUAL ALCOHOL SCREEN 15 MIN: CPT

## 2021-06-29 PROCEDURE — 99072 ADDL SUPL MATRL&STAF TM PHE: CPT

## 2021-06-29 PROCEDURE — 99395 PREV VISIT EST AGE 18-39: CPT | Mod: 25

## 2021-06-29 RX ORDER — METHYLPHENIDATE HYDROCHLORIDE 54 MG/1
54 TABLET, EXTENDED RELEASE ORAL
Qty: 30 | Refills: 0 | Status: COMPLETED | COMMUNITY
Start: 2019-06-03 | End: 2021-06-29

## 2021-06-29 RX ORDER — METHYLPHENIDATE HYDROCHLORIDE 18 MG/1
18 TABLET, EXTENDED RELEASE ORAL
Qty: 30 | Refills: 0 | Status: COMPLETED | COMMUNITY
Start: 2019-09-03 | End: 2021-06-29

## 2021-06-29 NOTE — PLAN
[FreeTextEntry1] : EKG UTD c Cardiology Dr. Vargas \par \par see lab orders \par \par Recommend Obesity Medicine consult c Dr. Carbajal

## 2021-06-29 NOTE — HISTORY OF PRESENT ILLNESS
[FreeTextEntry1] : CPE\par Pt. c/o weight [de-identified] : CPE \par as above +ve FAST no CP/SOB c activity no dizziness +ve intermittent  palpitations x 1 1/2 months.  Currently under the care of Cardiology Dr. Vargas,  EKG and Event Monitor performed WNL, 2D-Echo scheduled. no N/V/D +BM qd NL no bloody/black stools \par no acute change in bowel  habits no urinary complaints\par \par \par pt reports minimal caffeine intake, 1 cup of coffee/day \par Denies use of energy drinks \par Reports adequate hydration (1 gallon of H20/day) \par

## 2021-06-29 NOTE — REVIEW OF SYSTEMS
[Negative] : Heme/Lymph [FreeTextEntry5] : see HPI  [FreeTextEntry9] : Sciatica R sided  [de-identified] : Sciatica R sided

## 2021-06-29 NOTE — PHYSICAL EXAM
[No Acute Distress] : no acute distress [Well Nourished] : well nourished [Well Developed] : well developed [Well-Appearing] : well-appearing [EOMI] : extraocular movements intact [Normal Outer Ear/Nose] : the outer ears and nose were normal in appearance [No JVD] : no jugular venous distention [No Respiratory Distress] : no respiratory distress  [No Accessory Muscle Use] : no accessory muscle use [Clear to Auscultation] : lungs were clear to auscultation bilaterally [Normal Rate] : normal rate  [Regular Rhythm] : with a regular rhythm [Normal S1, S2] : normal S1 and S2 [No Carotid Bruits] : no carotid bruits [No Abdominal Bruit] : a ~M bruit was not heard ~T in the abdomen [No Edema] : there was no peripheral edema [No Palpable Aorta] : no palpable aorta [No Extremity Clubbing/Cyanosis] : no extremity clubbing/cyanosis [Declined Breast Exam] : declined breast exam  [Soft] : abdomen soft [Non Tender] : non-tender [Non-distended] : non-distended [No Masses] : no abdominal mass palpated [No HSM] : no HSM [Normal Bowel Sounds] : normal bowel sounds [Normal Posterior Cervical Nodes] : no posterior cervical lymphadenopathy [Normal Anterior Cervical Nodes] : no anterior cervical lymphadenopathy [No CVA Tenderness] : no CVA  tenderness [No Rash] : no rash [Coordination Grossly Intact] : coordination grossly intact [No Focal Deficits] : no focal deficits [Normal Gait] : normal gait [Normal Affect] : the affect was normal [Normal Mood] : the mood was normal [Normal Insight/Judgement] : insight and judgment were intact [de-identified] : +ve pain c SLR RLE

## 2021-06-29 NOTE — HEALTH RISK ASSESSMENT
[Patient reported PAP Smear was normal] : Patient reported PAP Smear was normal [PapSmearDate] : 08/20

## 2021-06-30 LAB
25(OH)D3 SERPL-MCNC: 35.6 NG/ML
ALBUMIN SERPL ELPH-MCNC: 4.6 G/DL
ALP BLD-CCNC: 77 U/L
ALT SERPL-CCNC: 27 U/L
ANION GAP SERPL CALC-SCNC: 13 MMOL/L
APPEARANCE: CLEAR
AST SERPL-CCNC: 20 U/L
BACTERIA: NEGATIVE
BASOPHILS # BLD AUTO: 0.03 K/UL
BASOPHILS NFR BLD AUTO: 0.4 %
BILIRUB SERPL-MCNC: 0.3 MG/DL
BILIRUBIN URINE: NEGATIVE
BLOOD URINE: NEGATIVE
BUN SERPL-MCNC: 12 MG/DL
CALCIUM SERPL-MCNC: 10.5 MG/DL
CHLORIDE SERPL-SCNC: 100 MMOL/L
CHOLEST SERPL-MCNC: 216 MG/DL
CO2 SERPL-SCNC: 24 MMOL/L
COLOR: NORMAL
COVID-19 NUCLEOCAPSID  GAM ANTIBODY INTERPRETATION: POSITIVE
COVID-19 SPIKE DOMAIN ANTIBODY INTERPRETATION: POSITIVE
CREAT SERPL-MCNC: 0.81 MG/DL
EOSINOPHIL # BLD AUTO: 0.11 K/UL
EOSINOPHIL NFR BLD AUTO: 1.4 %
ESTIMATED AVERAGE GLUCOSE: 94 MG/DL
GGT SERPL-CCNC: 21 U/L
GLUCOSE QUALITATIVE U: NEGATIVE
GLUCOSE SERPL-MCNC: 91 MG/DL
HBA1C MFR BLD HPLC: 4.9 %
HCT VFR BLD CALC: 43.1 %
HDLC SERPL-MCNC: 61 MG/DL
HGB BLD-MCNC: 14.1 G/DL
HYALINE CASTS: 0 /LPF
IMM GRANULOCYTES NFR BLD AUTO: 0.4 %
INSULIN SERPL-MCNC: 27.6 UU/ML
KETONES URINE: NEGATIVE
LDLC SERPL CALC-MCNC: 119 MG/DL
LEUKOCYTE ESTERASE URINE: NEGATIVE
LYMPHOCYTES # BLD AUTO: 1.63 K/UL
LYMPHOCYTES NFR BLD AUTO: 20.5 %
MAGNESIUM SERPL-MCNC: 1.9 MG/DL
MAN DIFF?: NORMAL
MCHC RBC-ENTMCNC: 30.1 PG
MCHC RBC-ENTMCNC: 32.7 GM/DL
MCV RBC AUTO: 91.9 FL
MICROSCOPIC-UA: NORMAL
MONOCYTES # BLD AUTO: 0.47 K/UL
MONOCYTES NFR BLD AUTO: 5.9 %
NEUTROPHILS # BLD AUTO: 5.69 K/UL
NEUTROPHILS NFR BLD AUTO: 71.4 %
NITRITE URINE: NEGATIVE
NONHDLC SERPL-MCNC: 155 MG/DL
PH URINE: 6
PLATELET # BLD AUTO: 303 K/UL
POTASSIUM SERPL-SCNC: 4.4 MMOL/L
PROT SERPL-MCNC: 7.6 G/DL
PROTEIN URINE: NEGATIVE
RBC # BLD: 4.69 M/UL
RBC # FLD: 13.3 %
RED BLOOD CELLS URINE: 1 /HPF
SARS-COV-2 AB SERPL IA-ACNC: >250 U/ML
SARS-COV-2 AB SERPL QL IA: 43.6 INDEX
SODIUM SERPL-SCNC: 137 MMOL/L
SPECIFIC GRAVITY URINE: 1.01
SQUAMOUS EPITHELIAL CELLS: 1 /HPF
T3FREE SERPL-MCNC: 3.3 PG/ML
T4 FREE SERPL-MCNC: 1.2 NG/DL
TRIGL SERPL-MCNC: 180 MG/DL
TSH SERPL-ACNC: 0.83 UIU/ML
URATE SERPL-MCNC: 4.8 MG/DL
UROBILINOGEN URINE: NORMAL
WBC # FLD AUTO: 7.96 K/UL
WHITE BLOOD CELLS URINE: 0 /HPF

## 2021-07-06 ENCOUNTER — TRANSCRIPTION ENCOUNTER (OUTPATIENT)
Age: 26
End: 2021-07-06

## 2021-07-13 ENCOUNTER — APPOINTMENT (OUTPATIENT)
Dept: FAMILY MEDICINE | Facility: CLINIC | Age: 26
End: 2021-07-13
Payer: COMMERCIAL

## 2021-07-13 VITALS
HEIGHT: 72 IN | WEIGHT: 293 LBS | TEMPERATURE: 98.2 F | OXYGEN SATURATION: 99 % | DIASTOLIC BLOOD PRESSURE: 74 MMHG | BODY MASS INDEX: 39.68 KG/M2 | SYSTOLIC BLOOD PRESSURE: 120 MMHG | HEART RATE: 116 BPM

## 2021-07-13 DIAGNOSIS — H69.82 OTHER SPECIFIED DISORDERS OF EUSTACHIAN TUBE, LEFT EAR: ICD-10-CM

## 2021-07-13 DIAGNOSIS — E88.81 METABOLIC SYNDROME: ICD-10-CM

## 2021-07-13 DIAGNOSIS — E28.2 POLYCYSTIC OVARIAN SYNDROME: ICD-10-CM

## 2021-07-13 PROCEDURE — 99072 ADDL SUPL MATRL&STAF TM PHE: CPT

## 2021-07-13 PROCEDURE — 99214 OFFICE O/P EST MOD 30 MIN: CPT

## 2021-07-13 NOTE — PHYSICAL EXAM
[No Acute Distress] : no acute distress [Well Nourished] : well nourished [Well Developed] : well developed [Well-Appearing] : well-appearing [EOMI] : extraocular movements intact [Normal Outer Ear/Nose] : the outer ears and nose were normal in appearance [No JVD] : no jugular venous distention [No Respiratory Distress] : no respiratory distress  [No Accessory Muscle Use] : no accessory muscle use [Clear to Auscultation] : lungs were clear to auscultation bilaterally [Normal Rate] : normal rate  [Regular Rhythm] : with a regular rhythm [Normal S1, S2] : normal S1 and S2 [No Carotid Bruits] : no carotid bruits [No Abdominal Bruit] : a ~M bruit was not heard ~T in the abdomen [No Edema] : there was no peripheral edema [No Palpable Aorta] : no palpable aorta [No Extremity Clubbing/Cyanosis] : no extremity clubbing/cyanosis [Soft] : abdomen soft [Non Tender] : non-tender [Non-distended] : non-distended [No Masses] : no abdominal mass palpated [No HSM] : no HSM [Normal Bowel Sounds] : normal bowel sounds [Normal Posterior Cervical Nodes] : no posterior cervical lymphadenopathy [Normal Anterior Cervical Nodes] : no anterior cervical lymphadenopathy [No CVA Tenderness] : no CVA  tenderness [No Rash] : no rash [Coordination Grossly Intact] : coordination grossly intact [No Focal Deficits] : no focal deficits [Normal Gait] : normal gait [Normal Affect] : the affect was normal [Normal Mood] : the mood was normal [Normal Insight/Judgement] : insight and judgment were intact [de-identified] : +ve pain c SLR RLE

## 2021-07-13 NOTE — HISTORY OF PRESENT ILLNESS
[FreeTextEntry1] : follow up on insulin resistance  [de-identified] : 24 yo female c hx of  BMI >40 and Insulin resistance presents for evaluation of wt gain.    Pt reports persistent wt gain despite\par dieting and CV exercise.  Pt is intolerant to MFN

## 2021-07-13 NOTE — PLAN
[FreeTextEntry1] : Trial of Victoza 0.6 mg SQ daily,   education and instruction w regards to injectable therapy provided \par \par f/u 4 weeks \par \par \par Trial of OTC Nasacort 2 sp ea nostril qd

## 2021-07-14 ENCOUNTER — APPOINTMENT (OUTPATIENT)
Dept: CARDIOLOGY | Facility: CLINIC | Age: 26
End: 2021-07-14
Payer: COMMERCIAL

## 2021-07-14 ENCOUNTER — NON-APPOINTMENT (OUTPATIENT)
Age: 26
End: 2021-07-14

## 2021-07-14 VITALS
DIASTOLIC BLOOD PRESSURE: 72 MMHG | SYSTOLIC BLOOD PRESSURE: 120 MMHG | HEART RATE: 94 BPM | WEIGHT: 293 LBS | HEIGHT: 72 IN | OXYGEN SATURATION: 100 % | BODY MASS INDEX: 39.68 KG/M2

## 2021-07-14 PROCEDURE — 93306 TTE W/DOPPLER COMPLETE: CPT

## 2021-07-14 PROCEDURE — 99072 ADDL SUPL MATRL&STAF TM PHE: CPT

## 2021-07-14 PROCEDURE — 99213 OFFICE O/P EST LOW 20 MIN: CPT

## 2021-07-15 NOTE — DISCUSSION/SUMMARY
[FreeTextEntry1] : Pt is a 24 y/o F with symptomatic PVCs\par \par symptomatic PVCs:\par PVCs <1%\par Advised to avoid triggers\par Offered BB initiation but pt does not want to start at this time.  Pt reassured\par \par VSS\par Pt will return in 12 mnths or sooner as needed but I encouraged communication via phone or portal if necessary. \par The described plan was discussed with the pt and mother.  All questions and concerns were addressed to the best of my knowledge.

## 2021-07-15 NOTE — HISTORY OF PRESENT ILLNESS
[FreeTextEntry1] : Pt is a 26 y/o F who presents today for f/u.  She continues to get palpitations usually in the morning when she wakes up, no associates symptoms.  Izaguirre shows NSR with rare ectopy - seems she has symptomatic PVCs  \par COVID+ 12/2020, not hospitalized\par \par izaguirre 05/2021 NSR avg HR 97, <1% PVCs\par \par PMH: none\par Family hx: grandfather MI/CABG/PCI 48\par Smoking status: never\par social ETOH\par no drug use\par Current exercise: 6x/week 1.5 hrs biking/walking\par Daily water intake: 1 gallon\par Daily caffeine intake: 1 cup coffee\par OTC medications: advil PRN, MVI\par NKDA\par Previous cardiac testing: none\par Previous hospitalizations: none\par

## 2021-07-16 ENCOUNTER — APPOINTMENT (OUTPATIENT)
Dept: FAMILY MEDICINE | Facility: CLINIC | Age: 26
End: 2021-07-16

## 2021-08-02 ENCOUNTER — TRANSCRIPTION ENCOUNTER (OUTPATIENT)
Age: 26
End: 2021-08-02

## 2021-08-05 DIAGNOSIS — Z23 ENCOUNTER FOR IMMUNIZATION: ICD-10-CM

## 2021-08-19 ENCOUNTER — APPOINTMENT (OUTPATIENT)
Dept: FAMILY MEDICINE | Facility: CLINIC | Age: 26
End: 2021-08-19

## 2021-08-27 ENCOUNTER — TRANSCRIPTION ENCOUNTER (OUTPATIENT)
Age: 26
End: 2021-08-27

## 2021-08-31 ENCOUNTER — NON-APPOINTMENT (OUTPATIENT)
Age: 26
End: 2021-08-31

## 2021-08-31 ENCOUNTER — TRANSCRIPTION ENCOUNTER (OUTPATIENT)
Age: 26
End: 2021-08-31

## 2021-09-01 ENCOUNTER — APPOINTMENT (OUTPATIENT)
Dept: OBGYN | Facility: CLINIC | Age: 26
End: 2021-09-01
Payer: COMMERCIAL

## 2021-09-01 VITALS
WEIGHT: 275 LBS | SYSTOLIC BLOOD PRESSURE: 126 MMHG | DIASTOLIC BLOOD PRESSURE: 80 MMHG | HEIGHT: 72 IN | TEMPERATURE: 96.4 F | BODY MASS INDEX: 37.25 KG/M2

## 2021-09-01 PROCEDURE — 99395 PREV VISIT EST AGE 18-39: CPT

## 2021-09-01 RX ORDER — PREDNISONE 20 MG/1
20 TABLET ORAL
Qty: 9 | Refills: 0 | Status: DISCONTINUED | COMMUNITY
Start: 2021-06-29 | End: 2021-09-01

## 2021-09-01 NOTE — PHYSICAL EXAM
[Appropriately responsive] : appropriately responsive [Alert] : alert [No Acute Distress] : no acute distress [Oriented x3] : oriented x3 [Examination Of The Breasts] : a normal appearance [No Masses] : no breast masses were palpable [Normal] : normal [Uterine Adnexae] : normal [FreeTextEntry1] : HS lesions in various stages of healing noted.

## 2021-09-01 NOTE — PLAN
[FreeTextEntry1] : - encouraged continued exercise, healthy diet. discussed nutrient dense foods, she may try the mediterranean  diet\par - magnesium recommended for leg cramps (pt had cramping during exam, and reports leg cramps at night)\par - f/u pap results\par - continue with ocps- correct use reviewed\par - for HS- continue follow up with derm.

## 2021-09-01 NOTE — HISTORY OF PRESENT ILLNESS
[Patient reported colonoscopy was normal] : Patient reported colonoscopy was normal [Gonorrhea test offered] : Gonorrhea test offered [Chlamydia test offered] : Chlamydia test offered [Oral Contraceptive] : uses oral contraception pills [Condoms] : uses condoms [Y] : Patient is sexually active [N] : Patient denies prior pregnancies [Menarche Age: ____] : age at menarche was [unfilled] [Currently Active] : currently active [Men] : men [No] : No [Patient refuses STI testing] : Patient refuses STI testing [TextBox_4] : pt presents for an annual visit. she is using ocps and happy with this method.  she did use plan b once last month and this month she skipped her placebo pills and started a new pack so she has had some btb. \par \par she is still dealing with the HS and sees dermatology.\par \par she has a history of pcos and insulin resistance- recently switched to victoza by her pcp as she could not tolerate metformin [PapSmeardate] : 08/27/2020 [TextBox_31] : Normal [ColonoscopyDate] : 05/05/2013 [GonorrheaDate] : 08/27/2020 [TextBox_63] : Negative (-) [ChlamydiaDate] : 08/27/2020 [TextBox_68] : Negative (-) [LMPDate] : 08/20/2021 [FreeTextEntry1] : 08/20/2021

## 2021-09-09 LAB — CYTOLOGY CVX/VAG DOC THIN PREP: NORMAL

## 2021-11-08 ENCOUNTER — NON-APPOINTMENT (OUTPATIENT)
Age: 26
End: 2021-11-08

## 2021-11-08 ENCOUNTER — APPOINTMENT (OUTPATIENT)
Dept: CARDIOLOGY | Facility: CLINIC | Age: 26
End: 2021-11-08
Payer: COMMERCIAL

## 2021-11-08 VITALS
HEART RATE: 110 BPM | HEIGHT: 72 IN | DIASTOLIC BLOOD PRESSURE: 72 MMHG | BODY MASS INDEX: 37.25 KG/M2 | SYSTOLIC BLOOD PRESSURE: 130 MMHG | OXYGEN SATURATION: 100 % | WEIGHT: 275 LBS

## 2021-11-08 PROCEDURE — 99214 OFFICE O/P EST MOD 30 MIN: CPT | Mod: 25

## 2021-11-08 PROCEDURE — 93000 ELECTROCARDIOGRAM COMPLETE: CPT | Mod: NC

## 2021-11-09 ENCOUNTER — APPOINTMENT (OUTPATIENT)
Dept: FAMILY MEDICINE | Facility: CLINIC | Age: 26
End: 2021-11-09
Payer: COMMERCIAL

## 2021-11-09 VITALS
HEART RATE: 114 BPM | HEIGHT: 72 IN | BODY MASS INDEX: 37.25 KG/M2 | DIASTOLIC BLOOD PRESSURE: 80 MMHG | SYSTOLIC BLOOD PRESSURE: 124 MMHG | OXYGEN SATURATION: 100 % | TEMPERATURE: 97.9 F | WEIGHT: 275 LBS

## 2021-11-09 DIAGNOSIS — E87.6 HYPOKALEMIA: ICD-10-CM

## 2021-11-09 DIAGNOSIS — M51.26 OTHER INTERVERTEBRAL DISC DISPLACEMENT, LUMBAR REGION: ICD-10-CM

## 2021-11-09 PROCEDURE — 99215 OFFICE O/P EST HI 40 MIN: CPT | Mod: 25

## 2021-11-09 PROCEDURE — 36415 COLL VENOUS BLD VENIPUNCTURE: CPT

## 2021-11-09 RX ORDER — TRIAMCINOLONE ACETONIDE 55 UG/1
55 SPRAY, METERED NASAL
Qty: 1 | Refills: 1 | Status: COMPLETED | COMMUNITY
Start: 2020-12-30 | End: 2021-11-09

## 2021-11-09 RX ORDER — DEXTROAMPHETAMINE SACCHARATE, AMPHETAMINE ASPARTATE, DEXTROAMPHETAMINE SULFATE AND AMPHETAMINE SULFATE 2.5; 2.5; 2.5; 2.5 MG/1; MG/1; MG/1; MG/1
10 TABLET ORAL
Qty: 30 | Refills: 0 | Status: COMPLETED | COMMUNITY
Start: 2021-06-29 | End: 2021-11-09

## 2021-11-09 NOTE — PHYSICAL EXAM
[No Acute Distress] : no acute distress [Well Nourished] : well nourished [Well Developed] : well developed [Well-Appearing] : well-appearing [EOMI] : extraocular movements intact [Normal Outer Ear/Nose] : the outer ears and nose were normal in appearance [No JVD] : no jugular venous distention [No Respiratory Distress] : no respiratory distress  [No Accessory Muscle Use] : no accessory muscle use [Clear to Auscultation] : lungs were clear to auscultation bilaterally [Normal Rate] : normal rate  [Regular Rhythm] : with a regular rhythm [Normal S1, S2] : normal S1 and S2 [No Carotid Bruits] : no carotid bruits [No Abdominal Bruit] : a ~M bruit was not heard ~T in the abdomen [No Edema] : there was no peripheral edema [No Palpable Aorta] : no palpable aorta [No Extremity Clubbing/Cyanosis] : no extremity clubbing/cyanosis [Soft] : abdomen soft [Non Tender] : non-tender [Non-distended] : non-distended [No HSM] : no HSM [Normal Bowel Sounds] : normal bowel sounds [Normal Posterior Cervical Nodes] : no posterior cervical lymphadenopathy [Normal Anterior Cervical Nodes] : no anterior cervical lymphadenopathy [No CVA Tenderness] : no CVA  tenderness [Grossly Normal Strength/Tone] : grossly normal strength/tone [No Rash] : no rash [Coordination Grossly Intact] : coordination grossly intact [No Focal Deficits] : no focal deficits [Normal Affect] : the affect was normal [Normal Mood] : the mood was normal [Normal Insight/Judgement] : insight and judgment were intact

## 2021-11-10 LAB
ANION GAP SERPL CALC-SCNC: 13 MMOL/L
BUN SERPL-MCNC: 9 MG/DL
CALCIUM SERPL-MCNC: 10.1 MG/DL
CHLORIDE SERPL-SCNC: 100 MMOL/L
CO2 SERPL-SCNC: 22 MMOL/L
CREAT SERPL-MCNC: 0.93 MG/DL
GLUCOSE SERPL-MCNC: 90 MG/DL
POTASSIUM SERPL-SCNC: 4.2 MMOL/L
SODIUM SERPL-SCNC: 135 MMOL/L

## 2021-11-10 NOTE — ASSESSMENT
[As per surgery] : as per surgery [FreeTextEntry4] : Medically optimized for proposed  procedure pending  BMP \par \par Addendum Note: Medically optimized for proposed  procedure

## 2021-11-10 NOTE — HISTORY OF PRESENT ILLNESS
[No Pertinent Cardiac History] : no history of aortic stenosis, atrial fibrillation, coronary artery disease, recent myocardial infarction, or implantable device/pacemaker [No Adverse Anesthesia Reaction] : no adverse anesthesia reaction in self or family member [(Patient denies any chest pain, claudication, dyspnea on exertion, orthopnea, palpitations or syncope)] : Patient denies any chest pain, claudication, dyspnea on exertion, orthopnea, palpitations or syncope [Asthma] : no asthma [COPD] : no COPD [Sleep Apnea] : no sleep apnea [Smoker] : not a smoker [Chronic Anticoagulation] : no chronic anticoagulation [Chronic Kidney Disease] : no chronic kidney disease [Diabetes] : no diabetes [FreeTextEntry1] : L4-L5 Discectomy  [FreeTextEntry2] : 11/16/21 [FreeTextEntry3] : Dr Auguste [FreeTextEntry4] : 25 yo female for medical clearance.  Scheduled for L4/L5 discectomy on 11/16/21 c Dr. Camilo at [facility] (f) [phone]\par \par  no CP/SOB c activity no dizziness Hx of  palpitations STABLE, cardiac evaluation up to date  no N/V/D +BM qod NL no bloody/black stools \par no acute change in bowel  habits no urinary complaints \par \par LMP: 11/2/21  \par \par \par NO Hx of adverse reaction to anesthesia \par NO allergy to shellfish/latex/iodine \par NO hx of Blood Transfusion \par ABO Blood Type=  A positive \par NO dentures\par

## 2021-11-12 ENCOUNTER — NON-APPOINTMENT (OUTPATIENT)
Age: 26
End: 2021-11-12

## 2021-11-12 NOTE — DISCUSSION/SUMMARY
[FreeTextEntry1] : Pt is a 25 y/o F who presents today for f/u.  She is scheduled back surgery with Dr Auguste at [facility] on 11/16/2021.  She is feeling well overall and has no cardiac complaints - denies CP, SOB, diaphoresis, palpitations, dizziness, syncope, LE edema, PND, orthopnea. She is able to walk 2-3 miles \par COVID+ 12/2020, not hospitalized\par \par montes 05/2021 NSR avg HR 97, <1% PVCs\par TTE 07/2021 EF 66%, no significant valvular disease\par \par At this time the pt has no signs or symptoms of active ischemia, heart failure, life-threatening arrhythmias, severe valvular pathology.\par VSS\par There are no cardiac contraindications for planned procedure. \par \par The described plan was discussed with the pt and mother.  All questions and concerns were addressed to the best of my knowledge.

## 2021-11-12 NOTE — HISTORY OF PRESENT ILLNESS
[FreeTextEntry1] : Pt is a 27 y/o F who presents today for f/u.  She is scheduled back surgery with Dr Auguste at [facility] on 11/16/2021.  She is feeling well overall and has no cardiac complaints - denies CP, SOB, diaphoresis, palpitations, dizziness, syncope, LE edema, PND, orthopnea. She is able to walk 2-3 miles \par COVID+ 12/2020, not hospitalized\par \par montes 05/2021 NSR avg HR 97, <1% PVCs\par TTE 07/2021 EF 66%, no significant valvular disease\par \par PMH: none\par Family hx: grandfather MI/CABG/PCI 48\par Smoking status: never\par social ETOH\par no drug use\par Current exercise: 6x/week 1.5 hrs biking/walking\par Daily water intake: 1 gallon\par Daily caffeine intake: 1 cup coffee\par OTC medications: advil PRN, MVI\par NKDA\par Previous cardiac testing: none\par Previous hospitalizations: none\par

## 2021-12-08 NOTE — ASSESSMENT
[FreeTextEntry1] : 24yofemale with obesity (BMI 38) and PCOS on metformin here for initial consult for possible bariatric surgery. Based on her high motivation and previous attempts at weight loss, I believe she would benefit greatly from bariatric surgery. She is most interested in a sleeve gastrectomy at this time. Griseofulvin Counseling:  I discussed with the patient the risks of griseofulvin including but not limited to photosensitivity, cytopenia, liver damage, nausea/vomiting and severe allergy.  The patient understands that this medication is best absorbed when taken with a fatty meal (e.g., ice cream or french fries).

## 2022-02-13 ENCOUNTER — TRANSCRIPTION ENCOUNTER (OUTPATIENT)
Age: 27
End: 2022-02-13

## 2022-02-15 ENCOUNTER — INPATIENT (INPATIENT)
Facility: HOSPITAL | Age: 27
LOS: 2 days | Discharge: HOME | DRG: 445 | End: 2022-02-18
Attending: INTERNAL MEDICINE | Admitting: HOSPITALIST
Payer: COMMERCIAL

## 2022-02-15 ENCOUNTER — APPOINTMENT (OUTPATIENT)
Dept: FAMILY MEDICINE | Facility: CLINIC | Age: 27
End: 2022-02-15
Payer: COMMERCIAL

## 2022-02-15 VITALS — WEIGHT: 179.9 LBS | HEIGHT: 72 IN

## 2022-02-15 VITALS
DIASTOLIC BLOOD PRESSURE: 78 MMHG | OXYGEN SATURATION: 98 % | SYSTOLIC BLOOD PRESSURE: 122 MMHG | HEIGHT: 72 IN | TEMPERATURE: 97.8 F

## 2022-02-15 DIAGNOSIS — R10.9 UNSPECIFIED ABDOMINAL PAIN: ICD-10-CM

## 2022-02-15 DIAGNOSIS — R79.89 OTHER SPECIFIED ABNORMAL FINDINGS OF BLOOD CHEMISTRY: ICD-10-CM

## 2022-02-15 LAB
ALBUMIN SERPL ELPH-MCNC: 2.8 G/DL — LOW (ref 3.3–5)
ALP SERPL-CCNC: 279 U/L — HIGH (ref 40–120)
ALT FLD-CCNC: 475 U/L — HIGH (ref 12–78)
ANION GAP SERPL CALC-SCNC: 8 MMOL/L — SIGNIFICANT CHANGE UP (ref 5–17)
APPEARANCE UR: ABNORMAL
APTT BLD: 28.7 SEC — SIGNIFICANT CHANGE UP (ref 27.5–35.5)
AST SERPL-CCNC: 247 U/L — HIGH (ref 15–37)
BASOPHILS # BLD AUTO: 0 K/UL — SIGNIFICANT CHANGE UP (ref 0–0.2)
BASOPHILS NFR BLD AUTO: 0 % — SIGNIFICANT CHANGE UP (ref 0–2)
BILIRUB SERPL-MCNC: 2.9 MG/DL — HIGH (ref 0.2–1.2)
BILIRUB UR-MCNC: ABNORMAL
BUN SERPL-MCNC: 7 MG/DL — SIGNIFICANT CHANGE UP (ref 7–23)
CALCIUM SERPL-MCNC: 9 MG/DL — SIGNIFICANT CHANGE UP (ref 8.5–10.1)
CHLORIDE SERPL-SCNC: 106 MMOL/L — SIGNIFICANT CHANGE UP (ref 96–108)
CO2 SERPL-SCNC: 22 MMOL/L — SIGNIFICANT CHANGE UP (ref 22–31)
COLOR SPEC: ABNORMAL
CREAT SERPL-MCNC: 0.73 MG/DL — SIGNIFICANT CHANGE UP (ref 0.5–1.3)
DIFF PNL FLD: ABNORMAL
EOSINOPHIL # BLD AUTO: 0 K/UL — SIGNIFICANT CHANGE UP (ref 0–0.5)
EOSINOPHIL NFR BLD AUTO: 0 % — SIGNIFICANT CHANGE UP (ref 0–6)
FLUAV AG NPH QL: SIGNIFICANT CHANGE UP
FLUBV AG NPH QL: SIGNIFICANT CHANGE UP
GLUCOSE SERPL-MCNC: 112 MG/DL — HIGH (ref 70–99)
GLUCOSE UR QL: NEGATIVE — SIGNIFICANT CHANGE UP
HAV IGM SER-ACNC: SIGNIFICANT CHANGE UP
HBV CORE IGM SER-ACNC: SIGNIFICANT CHANGE UP
HBV SURFACE AG SER-ACNC: SIGNIFICANT CHANGE UP
HCG SERPL-ACNC: <1 MIU/ML — SIGNIFICANT CHANGE UP
HCT VFR BLD CALC: 40.5 % — SIGNIFICANT CHANGE UP (ref 34.5–45)
HCV AB S/CO SERPL IA: 0.13 S/CO — SIGNIFICANT CHANGE UP (ref 0–0.99)
HCV AB SERPL-IMP: SIGNIFICANT CHANGE UP
HGB BLD-MCNC: 13.7 G/DL — SIGNIFICANT CHANGE UP (ref 11.5–15.5)
INR BLD: 1.09 RATIO — SIGNIFICANT CHANGE UP (ref 0.88–1.16)
KETONES UR-MCNC: ABNORMAL
LACTATE SERPL-SCNC: 1.5 MMOL/L — SIGNIFICANT CHANGE UP (ref 0.7–2)
LEUKOCYTE ESTERASE UR-ACNC: ABNORMAL
LIDOCAIN IGE QN: 166 U/L — SIGNIFICANT CHANGE UP (ref 73–393)
LYMPHOCYTES # BLD AUTO: 6.85 K/UL — HIGH (ref 1–3.3)
LYMPHOCYTES # BLD AUTO: 72 % — HIGH (ref 13–44)
MCHC RBC-ENTMCNC: 28.4 PG — SIGNIFICANT CHANGE UP (ref 27–34)
MCHC RBC-ENTMCNC: 33.8 GM/DL — SIGNIFICANT CHANGE UP (ref 32–36)
MCV RBC AUTO: 83.9 FL — SIGNIFICANT CHANGE UP (ref 80–100)
MONOCYTES # BLD AUTO: 0.67 K/UL — SIGNIFICANT CHANGE UP (ref 0–0.9)
MONOCYTES NFR BLD AUTO: 7 % — SIGNIFICANT CHANGE UP (ref 2–14)
NEUTROPHILS # BLD AUTO: 1.52 K/UL — LOW (ref 1.8–7.4)
NEUTROPHILS NFR BLD AUTO: 16 % — LOW (ref 43–77)
NITRITE UR-MCNC: POSITIVE
NRBC # BLD: SIGNIFICANT CHANGE UP /100 WBCS (ref 0–0)
PH UR: 5 — SIGNIFICANT CHANGE UP (ref 5–8)
PLATELET # BLD AUTO: 172 K/UL — SIGNIFICANT CHANGE UP (ref 150–400)
POTASSIUM SERPL-MCNC: 3.5 MMOL/L — SIGNIFICANT CHANGE UP (ref 3.5–5.3)
POTASSIUM SERPL-SCNC: 3.5 MMOL/L — SIGNIFICANT CHANGE UP (ref 3.5–5.3)
PROT SERPL-MCNC: 7.4 GM/DL — SIGNIFICANT CHANGE UP (ref 6–8.3)
PROT UR-MCNC: 15
PROTHROM AB SERPL-ACNC: 12.6 SEC — SIGNIFICANT CHANGE UP (ref 10.6–13.6)
RBC # BLD: 4.83 M/UL — SIGNIFICANT CHANGE UP (ref 3.8–5.2)
RBC # FLD: 13.3 % — SIGNIFICANT CHANGE UP (ref 10.3–14.5)
RSV RNA NPH QL NAA+NON-PROBE: SIGNIFICANT CHANGE UP
SARS-COV-2 RNA SPEC QL NAA+PROBE: SIGNIFICANT CHANGE UP
SODIUM SERPL-SCNC: 136 MMOL/L — SIGNIFICANT CHANGE UP (ref 135–145)
SP GR SPEC: 1.02 — SIGNIFICANT CHANGE UP (ref 1.01–1.02)
UROBILINOGEN FLD QL: 8
WBC # BLD: 9.52 K/UL — SIGNIFICANT CHANGE UP (ref 3.8–10.5)
WBC # FLD AUTO: 9.52 K/UL — SIGNIFICANT CHANGE UP (ref 3.8–10.5)

## 2022-02-15 PROCEDURE — 86663 EPSTEIN-BARR ANTIBODY: CPT

## 2022-02-15 PROCEDURE — 86665 EPSTEIN-BARR CAPSID VCA: CPT

## 2022-02-15 PROCEDURE — 80048 BASIC METABOLIC PNL TOTAL CA: CPT

## 2022-02-15 PROCEDURE — 86900 BLOOD TYPING SEROLOGIC ABO: CPT

## 2022-02-15 PROCEDURE — 99222 1ST HOSP IP/OBS MODERATE 55: CPT

## 2022-02-15 PROCEDURE — 85730 THROMBOPLASTIN TIME PARTIAL: CPT

## 2022-02-15 PROCEDURE — C1889: CPT

## 2022-02-15 PROCEDURE — 80053 COMPREHEN METABOLIC PANEL: CPT

## 2022-02-15 PROCEDURE — 80076 HEPATIC FUNCTION PANEL: CPT

## 2022-02-15 PROCEDURE — 86850 RBC ANTIBODY SCREEN: CPT

## 2022-02-15 PROCEDURE — 99223 1ST HOSP IP/OBS HIGH 75: CPT

## 2022-02-15 PROCEDURE — 83735 ASSAY OF MAGNESIUM: CPT

## 2022-02-15 PROCEDURE — 84100 ASSAY OF PHOSPHORUS: CPT

## 2022-02-15 PROCEDURE — C1769: CPT

## 2022-02-15 PROCEDURE — C2625: CPT

## 2022-02-15 PROCEDURE — 85027 COMPLETE CBC AUTOMATED: CPT

## 2022-02-15 PROCEDURE — 82728 ASSAY OF FERRITIN: CPT

## 2022-02-15 PROCEDURE — 93976 VASCULAR STUDY: CPT | Mod: 26

## 2022-02-15 PROCEDURE — 86255 FLUORESCENT ANTIBODY SCREEN: CPT

## 2022-02-15 PROCEDURE — 76000 FLUOROSCOPY <1 HR PHYS/QHP: CPT

## 2022-02-15 PROCEDURE — 86901 BLOOD TYPING SEROLOGIC RH(D): CPT

## 2022-02-15 PROCEDURE — 86039 ANTINUCLEAR ANTIBODIES (ANA): CPT

## 2022-02-15 PROCEDURE — 86381 MITOCHONDRIAL ANTIBODY EACH: CPT

## 2022-02-15 PROCEDURE — 85610 PROTHROMBIN TIME: CPT

## 2022-02-15 PROCEDURE — 99285 EMERGENCY DEPT VISIT HI MDM: CPT

## 2022-02-15 PROCEDURE — C9399: CPT

## 2022-02-15 PROCEDURE — 93975 VASCULAR STUDY: CPT

## 2022-02-15 PROCEDURE — 80074 ACUTE HEPATITIS PANEL: CPT

## 2022-02-15 PROCEDURE — 86225 DNA ANTIBODY NATIVE: CPT

## 2022-02-15 PROCEDURE — 76705 ECHO EXAM OF ABDOMEN: CPT

## 2022-02-15 PROCEDURE — 36415 COLL VENOUS BLD VENIPUNCTURE: CPT

## 2022-02-15 PROCEDURE — 76705 ECHO EXAM OF ABDOMEN: CPT | Mod: 26,59

## 2022-02-15 PROCEDURE — 99214 OFFICE O/P EST MOD 30 MIN: CPT

## 2022-02-15 PROCEDURE — 87040 BLOOD CULTURE FOR BACTERIA: CPT

## 2022-02-15 PROCEDURE — 86664 EPSTEIN-BARR NUCLEAR ANTIGEN: CPT

## 2022-02-15 PROCEDURE — 82977 ASSAY OF GGT: CPT

## 2022-02-15 RX ORDER — CEFTRIAXONE 500 MG/1
INJECTION, POWDER, FOR SOLUTION INTRAMUSCULAR; INTRAVENOUS
Refills: 0 | Status: DISCONTINUED | OUTPATIENT
Start: 2022-02-15 | End: 2022-02-15

## 2022-02-15 RX ORDER — SPIRONOLACTONE 25 MG/1
50 TABLET, FILM COATED ORAL
Refills: 0 | Status: DISCONTINUED | OUTPATIENT
Start: 2022-02-15 | End: 2022-02-18

## 2022-02-15 RX ORDER — LANOLIN ALCOHOL/MO/W.PET/CERES
3 CREAM (GRAM) TOPICAL AT BEDTIME
Refills: 0 | Status: DISCONTINUED | OUTPATIENT
Start: 2022-02-15 | End: 2022-02-18

## 2022-02-15 RX ORDER — CEFTRIAXONE 500 MG/1
1000 INJECTION, POWDER, FOR SOLUTION INTRAMUSCULAR; INTRAVENOUS
Refills: 0 | Status: DISCONTINUED | OUTPATIENT
Start: 2022-02-15 | End: 2022-02-16

## 2022-02-15 RX ORDER — ONDANSETRON 8 MG/1
4 TABLET, FILM COATED ORAL EVERY 8 HOURS
Refills: 0 | Status: DISCONTINUED | OUTPATIENT
Start: 2022-02-15 | End: 2022-02-18

## 2022-02-15 RX ORDER — SODIUM CHLORIDE 9 MG/ML
1000 INJECTION INTRAMUSCULAR; INTRAVENOUS; SUBCUTANEOUS ONCE
Refills: 0 | Status: COMPLETED | OUTPATIENT
Start: 2022-02-15 | End: 2022-02-15

## 2022-02-15 RX ORDER — DIPHENHYDRAMINE HCL 50 MG
25 CAPSULE ORAL ONCE
Refills: 0 | Status: COMPLETED | OUTPATIENT
Start: 2022-02-15 | End: 2022-02-15

## 2022-02-15 RX ORDER — ALPRAZOLAM 0.25 MG
0.25 TABLET ORAL ONCE
Refills: 0 | Status: COMPLETED | OUTPATIENT
Start: 2022-02-15 | End: 2022-02-15

## 2022-02-15 RX ORDER — CEFTRIAXONE 500 MG/1
1000 INJECTION, POWDER, FOR SOLUTION INTRAMUSCULAR; INTRAVENOUS ONCE
Refills: 0 | Status: COMPLETED | OUTPATIENT
Start: 2022-02-15 | End: 2022-02-15

## 2022-02-15 RX ORDER — SODIUM CHLORIDE 9 MG/ML
1000 INJECTION INTRAMUSCULAR; INTRAVENOUS; SUBCUTANEOUS
Refills: 0 | Status: COMPLETED | OUTPATIENT
Start: 2022-02-15 | End: 2022-02-15

## 2022-02-15 RX ADMIN — SODIUM CHLORIDE 125 MILLILITER(S): 9 INJECTION INTRAMUSCULAR; INTRAVENOUS; SUBCUTANEOUS at 18:23

## 2022-02-15 RX ADMIN — Medication 30 MILLILITER(S): at 22:43

## 2022-02-15 RX ADMIN — Medication 25 MILLIGRAM(S): at 23:11

## 2022-02-15 RX ADMIN — CEFTRIAXONE 1000 MILLIGRAM(S): 500 INJECTION, POWDER, FOR SOLUTION INTRAMUSCULAR; INTRAVENOUS at 18:24

## 2022-02-15 RX ADMIN — SODIUM CHLORIDE 2000 MILLILITER(S): 9 INJECTION INTRAMUSCULAR; INTRAVENOUS; SUBCUTANEOUS at 13:54

## 2022-02-15 NOTE — ED ADULT TRIAGE NOTE - CHIEF COMPLAINT QUOTE
patient presenting ambulatory to ED c/o umbilical area abdominal pain with nausea x1 week. denies vomiting, diarrhea, fever. patient states she had recent blood work that showed a bilirubin result of 2.8. patient was seen at [facility] 1 week ago for similar symptoms.

## 2022-02-15 NOTE — PHARMACOTHERAPY INTERVENTION NOTE - COMMENTS
Medication history complete, reviewed medication with patient and confimed with Ashley Glynn.  Patient hasn't taken any medication in the last week except for the Tri-Lo-Dara.  Patient also advised that pharmacy doesn't stock Tri-Lo-Dara and will have family bring it in.

## 2022-02-15 NOTE — HISTORY OF PRESENT ILLNESS
[FreeTextEntry1] : ER follow up \par pt. c/o stomach pain going on for 3 days  [de-identified] : Eufemia is a 26 year female here today f/u for ER visit regarding stomach pain. Pt confirmed a week and half ago she woke up with swollen face and LT eye. On 02/10/22 she woke up with swollen face and stomach pain. On 02/11/22, pt started feeling chills and itchiness throughout her whole body along with the rest of her preexisting sx. Pt stated she has been taking Victoza 18 MG/3ML compliantly for 6 months and believes sx to be side effects. Pt stated she had been increased to a higher dose in September 2021. Pt confirmed she had stopped taking Victoza last week. Patient had been to [facility] for sonogram and abdominal CT scan. Pt reported there were no stones found in imaging and she was discharged. Pt also noted her urine is a dark, tea color and confirms she still has her gall bladder. Pt confirmed her chills have gone away but she still feels itchiness, stomach pain, swollen face, bloating and dark urination. Pt confirmed she has also had a high heart rate for the past week and a half. Pt stated she feels a little lower abdominal pain as well.

## 2022-02-15 NOTE — H&P ADULT - NSHPREVIEWOFSYSTEMS_GEN_ALL_CORE
REVIEW OF SYSTEMS:    CONSTITUTIONAL: No weakness, fevers or chills  EYES/ENT: No visual changes;  No vertigo or throat pain   NECK: No pain or stiffness  RESPIRATORY: No cough, wheezing, hemoptysis; No shortness of breath  CARDIOVASCULAR: No chest pain or palpitations  GASTROINTESTINAL: *******dark brown stool, dark urine and epigastric pain******  GENITOURINARY: No dysuria, frequency or hematuria  NEUROLOGICAL: No numbness or weakness  SKIN: No itching, burning, rashes, or lesions   All other review of systems is negative unless indicated above

## 2022-02-15 NOTE — H&P ADULT - NSHPLABSRESULTS_GEN_ALL_CORE
LABS: All Labs Reviewed:                        13.7   9.52  )-----------( 172      ( 15 Feb 2022 13:47 )             40.5     02-15    136  |  106  |  7   ----------------------------<  112<H>  3.5   |  22  |  0.73    Ca    9.0      15 Feb 2022 13:47    TPro  7.4  /  Alb  2.8<L>  /  TBili  2.9<H>  /  DBili  x   /  AST  247<H>  /  ALT  475<H>  /  AlkPhos  279<H>  02-15    PT/INR - ( 15 Feb 2022 13:47 )   PT: 12.6 sec;   INR: 1.09 ratio         PTT - ( 15 Feb 2022 13:47 )  PTT:28.7 sec          Blood Culture:

## 2022-02-15 NOTE — ED STATDOCS - OBJECTIVE STATEMENT
25 y/o female with a PMHx of COVID in December 2020, PCOS, s/p microdiscectomy in November 2021 at [place] presents to the ED sent by Dr. Bone for abd pain, facial swelling and abnormal labs. Pt reports a week and a half ago she noticed facial swelling. Pt notes 1 week ago she developed abd pain. Pt was seen at [facility] 2 days ago, had workup and found to have an elevated bilirubin and LFTs. Pt had imaging showing no gall stones. Pt notes she is itchy and her urine is dark. LNMP: 1/27/22. No known sick contacts. Vaccinated for COVID. Nonsmoker. Occasional EtOH use. No other complaints at this time.

## 2022-02-15 NOTE — CONSULT NOTE ADULT - ASSESSMENT
Full note to follow. Pt seen in ED holding. Epigastric pain, post prandial, chills and jaundice. Concern for choledocho vs DILI as recent medications vs viral. F/u U/S and MRCP. NPO at midnight in case needs intervention. 26 year old woman with obesity, PCOS, with 5 days of epigastric and RUQ pain, admitted with elevated liver function tests.     Ddx here is biliary as she has symptoms of biliary colic, although prior imaging at [facility] negative. Agree with U/S with duplex and if negative then MRCP. May need endoscopic ultrasound to r/o small stone/sludge.     She started a few new meds so drug induced liver injury is always on the ddx, but she has no signs/symptoms of liver failure (INR normal) so no need for NAC, plus typically DILI gives painless jaundice. Just hold any non-essential medications for now.     Could be viral hepatitis, so will need to f/u acute hep labs.     Autoimmune hepatitis and other chronic liver diseases could be on ddx but according to chart LFT's were normal in november and patient denies any history of abnormal liver function.  26 year old woman with obesity, PCOS, with 5 days of epigastric and RUQ pain, admitted with elevated liver function tests.     Ddx here is biliary as she has symptoms of biliary colic, although prior imaging at [facility] negative. Agree with U/S with duplex and if negative then MRCP. May need endoscopic ultrasound to r/o small stone/sludge.     She started a few new meds so drug induced liver injury is always on the ddx, but she has no signs/symptoms of liver failure (INR normal) so no need for NAC, plus typically DILI gives painless jaundice. Just hold any non-essential medications for now.     Could be viral hepatitis, but acute hep labs negative and covid negative. This isn't presenting like acute on chronic liver disease.      Autoimmune hepatitis and other chronic liver diseases could be on ddx but according to chart LFT's were normal in november and patient denies any history of abnormal liver function. If biliary work up negative may need liver biopsy.

## 2022-02-15 NOTE — H&P ADULT - NSHPPHYSICALEXAM_GEN_ALL_CORE
PHYSICAL EXAM:    Constitutional: NAD, awake and alert  HEENT: PERR, EOMI, Normal Hearing, MMM  Neck: Soft and supple, No LAD, No JVD  Respiratory: Breath sounds are clear bilaterally, No wheezing, rales or rhonchi  Cardiovascular: S1 and S2, regular rate and rhythm, no Murmurs, gallops or rubs  Gastrointestinal: Bowel Sounds present, soft, nontender, nondistended, no guarding, no rebound  Extremities: No peripheral edema  Vascular: 2+ peripheral pulses  Neurological: A/O x 3, no focal deficits  Musculoskeletal: 5/5 strength b/l upper and lower extremities  Skin: No rashes        PHYSICAL EXAM:    Constitutional: NAD, awake and alert  HEENT: PERR, EOMI, Normal Hearing, MMM  Neck: Soft and supple, No LAD, No JVD  Respiratory: Breath sounds are clear bilaterally, No wheezing, rales or rhonchi  Cardiovascular: S1 and S2, regular rate and rhythm, no Murmurs, gallops or rubs  Gastrointestinal: Bowel Sounds present, soft, nontender, nondistended, no guarding, no rebound  Extremities: No peripheral edema  Vascular: 2+ peripheral pulses  Neurological: A/O x 3, no focal deficits  Musculoskeletal: 5/5 strength b/l upper and lower extremities  Skin: No rashes

## 2022-02-15 NOTE — ED STATDOCS - CLINICAL SUMMARY MEDICAL DECISION MAKING FREE TEXT BOX
25 y/o female with swelling, itching,. Pt had workup at [facility], CT and US. Pt with elevated Tbili and sent by PMD for evaluation. Pt aware she has large right ovarian cyst and follows with OBGYN.

## 2022-02-15 NOTE — ED ADULT NURSE NOTE - CHIEF COMPLAINT QUOTE
patient presenting ambulatory to ED c/o umbilical area abdominal pain with nausea x1 week. denies vomiting, diarrhea, fever. patient states she had recent blood work that showed a bilirubin result of 2.8. patient was seen at [facility] 1 week ago for similar symptoms.

## 2022-02-15 NOTE — REVIEW OF SYSTEMS
[Abdominal Pain] : abdominal pain [Itching] : Itching [Negative] : Heme/Lymph [FreeTextEntry3] : swelling [FreeTextEntry8] : Dark urine, tea-colored [de-identified] : swelling of face, hives

## 2022-02-15 NOTE — CONSULT NOTE ADULT - SUBJECTIVE AND OBJECTIVE BOX
Full note to follow. Pt seen in ED holding. Epigastric pain, post prandial, chills and jaundice. Concern for choledocho vs DILI as recent medications vs viral. F/u U/S and MRCP. NPO at midnight in case needs intervention.  Patient is a 26y old  Female who presents with a chief complaint of epigastric pain, abnormal liver function tests (15 Feb 2022 16:38)          HPI:  27 yo obese female with pmh pcos, chronic back pain s/p micro discectomy in 11/2021, vaccinated and boosted presents with rising LFTs and pruritis. Pt was recently dc from [facility] few days ago after she wad endorses epigastric discomfort, generlized itching and was noted to have LFTs in th 100-200 with a t bili on 2.4. At that time, she was ruled out for cholelithiasis but had findings on U/S and CTAP for hepatic steatosis. TG was in the low 200s. She was then discharged and asked to follow up with GI as o/p  Since then, she endorses ongoing epigastric tenderness, facial swelling and itching. No h/o tylenol overuse. No h/o ivda. No transfusions. No Fhx of AI diseases. PT recently started on victoza, phentermine, topamax as part of a wt loss plan. She is also on spirolonactone and tri-lo-suzan for pcos.   She drinks etoh on occasion. No bleeding. No rashes  LFTs were normal in 11/2021 prior to her spinal surgery  Denies exotic travel or foods  Afebrile.       social: social etoh, no drug/tobacco  Shx: as above  fhx: no h/o GI related illnesses (15 Feb 2022 16:21)      PAST MEDICAL & SURGICAL HISTORY:  PCOS (polycystic ovarian syndrome)    Ovarian cyst        MEDICATIONS  (STANDING):  cefTRIAXone   IVPB 1000 milliGRAM(s) IV Intermittent <User Schedule>  sodium chloride 0.9%. 1000 milliLiter(s) (125 mL/Hr) IV Continuous <Continuous>  spironolactone Oral Tab/Cap - Peds 50 milliGRAM(s) Oral two times a day    MEDICATIONS  (PRN):  aluminum hydroxide/magnesium hydroxide/simethicone Suspension 30 milliLiter(s) Oral every 4 hours PRN Dyspepsia  melatonin 3 milliGRAM(s) Oral at bedtime PRN Insomnia  ondansetron Injectable 4 milliGRAM(s) IV Push every 8 hours PRN Nausea and/or Vomiting      Allergies    No Known Allergies    Intolerances        SOCIAL HISTORY:    FAMILY HISTORY:      REVIEW OF SYSTEMS:    CONSTITUTIONAL: No weakness, fevers or chills  EYES/ENT: No visual changes;  No vertigo or throat pain   NECK: No pain or stiffness  RESPIRATORY: No cough, wheezing, hemoptysis; No shortness of breath  CARDIOVASCULAR: No chest pain or palpitations  GASTROINTESTINAL: No abdominal or epigastric pain. No nausea, vomiting, or hematemesis; No diarrhea or constipation. No melena or hematochezia.  GENITOURINARY: No dysuria, frequency or hematuria  NEUROLOGICAL: No numbness or weakness  SKIN: No itching, burning, rashes, or lesions   PSYCH: Normal mood and affect  All other review of systems is negative unless indicated above.    Vital Signs Last 24 Hrs  T(C): 36.7 (15 Feb 2022 20:17), Max: 36.8 (15 Feb 2022 13:04)  T(F): 98 (15 Feb 2022 20:17), Max: 98.2 (15 Feb 2022 13:04)  HR: 97 (15 Feb 2022 20:17) (97 - 135)  BP: 119/65 (15 Feb 2022 20:17) (112/83 - 119/65)  BP(mean): 94 (15 Feb 2022 13:04) (94 - 94)  RR: 18 (15 Feb 2022 20:17) (18 - 18)  SpO2: 100% (15 Feb 2022 20:17) (98% - 100%)    PHYSICAL EXAM:    Constitutional: No acute distress, well-developed, non-toxic appearing  HEENT: masked, good phonation, not icteric  Neck: supple, no lymphadenopathy  Respiratory: clear to ascultation bilaterally, no wheezing  Cardiovascular: S1 and S2, regular rate and rhythm, no murmurs rubs or gallops  Gastrointestinal: soft, non-tender, non-distended, +bowel sounds, no rebound or guarding, no surgical scars, no drains  Extremities: No peripheral edema, no cyanosis or clubbing  Vascular: 2+ peripheral pulses, no venous stasis  Neurological: A/O x 3, no focal deficits, no asterixis  Psychiatric: Normal mood, normal affect  Skin: No rashes, not jaundiced    LABS:                        13.7   9.52  )-----------( 172      ( 15 Feb 2022 13:47 )             40.5     02-15    136  |  106  |  7   ----------------------------<  112<H>  3.5   |  22  |  0.73    Ca    9.0      15 Feb 2022 13:47    TPro  7.4  /  Alb  2.8<L>  /  TBili  2.9<H>  /  DBili  x   /  AST  247<H>  /  ALT  475<H>  /  AlkPhos  279<H>  02-15    PT/INR - ( 15 Feb 2022 13:47 )   PT: 12.6 sec;   INR: 1.09 ratio         PTT - ( 15 Feb 2022 13:47 )  PTT:28.7 sec  LIVER FUNCTIONS - ( 15 Feb 2022 18:09 )  Alb: x     / Pro: x     / ALK PHOS: x     / ALT: x     / AST: x     / GGT: 127 U/L         RADIOLOGY & ADDITIONAL STUDIES: Patient is a 26y old  Female who presents with a chief complaint of epigastric pain, abnormal liver function tests (15 Feb 2022 16:38)      26 year old woman with obesity, PCOS, with 5 days of epigastric and RUQ pain, admitted with elevated liver function tests.     Patient states that on Friday night began to have epigastric and RUQ pain. Pain is sharp/stabbing. Non radiating. Can be 9/10 at times but is intermittent. Assocaited with nausea but no vomiting. Poor PO intake when pain started. No known exacerbating factors. No known alleviating factors other than time. Reports chills but no documented fevers. She went to [facility] and was found to have mildly elevated LFT's and bili but U/S and CT negative for acute pathology, was discharged. Symptoms were ongoing so she came here. She also reports severe itchiness all over, ?facial swelling. She has started new medications for weight loss recently (victoza, phentermine, topomax). Denies excessive tylenol use, no excessive/daily ETOH, no IVDA, no blood transfusions. No history of liver issues. No travel history or sick contacts.     PAST MEDICAL & SURGICAL HISTORY:  PCOS (polycystic ovarian syndrome)  Ovarian cyst        MEDICATIONS  (STANDING):  cefTRIAXone   IVPB 1000 milliGRAM(s) IV Intermittent <User Schedule>  sodium chloride 0.9%. 1000 milliLiter(s) (125 mL/Hr) IV Continuous <Continuous>  spironolactone Oral Tab/Cap - Peds 50 milliGRAM(s) Oral two times a day    MEDICATIONS  (PRN):  aluminum hydroxide/magnesium hydroxide/simethicone Suspension 30 milliLiter(s) Oral every 4 hours PRN Dyspepsia  melatonin 3 milliGRAM(s) Oral at bedtime PRN Insomnia  ondansetron Injectable 4 milliGRAM(s) IV Push every 8 hours PRN Nausea and/or Vomiting      Allergies    No Known Allergies    Intolerances    SOCIAL HISTORY:  no smoking, no drugs, social not excessive ETOH use    FAMILY HISTORY:  no history of liver disease, no colon or stomach cancer    REVIEW OF SYSTEMS:    CONSTITUTIONAL: No weakness, fevers, + chills  EYES/ENT: No visual changes;  No vertigo or throat pain, eye swelling  NECK: No pain or stiffness  RESPIRATORY: No cough, wheezing, hemoptysis; No shortness of breath  CARDIOVASCULAR: No chest pain or palpitations  GASTROINTESTINAL: per HPI  GENITOURINARY: No dysuria, frequency or hematuria  NEUROLOGICAL: No numbness or weakness  SKIN: + itching, burning, rashes, or lesions   PSYCH: Normal mood and affect  All other review of systems is negative unless indicated above.    Vital Signs Last 24 Hrs  T(C): 36.7 (15 Feb 2022 20:17), Max: 36.8 (15 Feb 2022 13:04)  T(F): 98 (15 Feb 2022 20:17), Max: 98.2 (15 Feb 2022 13:04)  HR: 97 (15 Feb 2022 20:17) (97 - 135)  BP: 119/65 (15 Feb 2022 20:17) (112/83 - 119/65)  BP(mean): 94 (15 Feb 2022 13:04) (94 - 94)  RR: 18 (15 Feb 2022 20:17) (18 - 18)  SpO2: 100% (15 Feb 2022 20:17) (98% - 100%)    PHYSICAL EXAM:    Constitutional: No acute distress, well-developed, non-toxic appearing  HEENT: masked, good phonation, + icteric  Neck: supple, no lymphadenopathy  Respiratory: clear to ascultation bilaterally, no wheezing  Cardiovascular: S1 and S2, regular rate and rhythm, no murmurs rubs or gallops  Gastrointestinal: soft, tender to deep palpation in epigastric/RUQ, negative gonzales's sign, non-distended, +bowel sounds, no rebound or guarding, no surgical scars, no drains  Extremities: No peripheral edema, no cyanosis or clubbing  Vascular: 2+ peripheral pulses, no venous stasis  Neurological: A/O x 3, no focal deficits, no asterixis  Psychiatric: Normal mood, normal affect  Skin: No rashes, + jaundiced    LABS:                        13.7   9.52  )-----------( 172      ( 15 Feb 2022 13:47 )             40.5     02-15    136  |  106  |  7   ----------------------------<  112<H>  3.5   |  22  |  0.73    Ca    9.0      15 Feb 2022 13:47    TPro  7.4  /  Alb  2.8<L>  /  TBili  2.9<H>  /  DBili  x   /  AST  247<H>  /  ALT  475<H>  /  AlkPhos  279<H>  02-15    PT/INR - ( 15 Feb 2022 13:47 )   PT: 12.6 sec;   INR: 1.09 ratio         PTT - ( 15 Feb 2022 13:47 )  PTT:28.7 sec  LIVER FUNCTIONS - ( 15 Feb 2022 18:09 )  Alb: x     / Pro: x     / ALK PHOS: x     / ALT: x     / AST: x     / GGT: 127 U/L         RADIOLOGY & ADDITIONAL STUDIES: reviewed her imaging from [facility], negative U/S and negative CT scan

## 2022-02-15 NOTE — END OF VISIT
[FreeTextEntry3] : Medical record entries made by the scribe today today, were at my direction and personally dictated to them by me, Dr. Nya Bone on Feb 15, 2022. I have reviewed the chart and agree that the record accurately reflects my personal performance of the history, physical exam, assessment, and plan.\par

## 2022-02-15 NOTE — PLAN
[FreeTextEntry1] : - Possible obstruction in biliary duct: Patient immediately sent to [facility]

## 2022-02-15 NOTE — ED STATDOCS - ATTENDING CONTRIBUTION TO CARE
I, Radha Tinsley MD,  performed the initial face to face bedside interview with this patient regarding history of present illness, review of symptoms and relevant past medical, social and family history.  I completed an independent physical examination.  I was the initial provider who evaluated this patient.   I personally saw the patient and performed a substantive portion of the visit including all aspects of the medical decision making.  I have signed out the follow up of any pending tests (i.e. labs, radiological studies) to the ACP.  I have communicated the patient’s plan of care and disposition with the ACP.  The history, relevant review of systems, past medical and surgical history, medical decision making, and physical examination was documented by the scribe in my presence and I attest to the accuracy of the documentation.

## 2022-02-15 NOTE — H&P ADULT - ASSESSMENT
#Transaminitis/pruritis  Ddx: Drug induced liver injury vs AI hepatitis vs viral hepatitis vs non alcoholic hepatic steatosis  -admit to [facility]  -Check duplex and r/o PVT (use of birth control can lead to hypercoagulable state)  -MRI abdomen  -TICO, Anti-dsDNA, pANCA, anti mitochondrial abx, ferritin, ggt  -Hold topamax/phentermine/victoza - topamax also has known s/e of liver failure  -GI consult    #UTI  -start ceftriaxone 1gm iv qd  -urine cultures      DVT px: SCDs/ambulation

## 2022-02-15 NOTE — PHYSICAL EXAM
[No Acute Distress] : no acute distress [Well Nourished] : well nourished [Well Developed] : well developed [Normal Sclera/Conjunctiva] : normal sclera/conjunctiva [PERRL] : pupils equal round and reactive to light [EOMI] : extraocular movements intact [Normal Outer Ear/Nose] : the outer ears and nose were normal in appearance [Normal Oropharynx] : the oropharynx was normal [No JVD] : no jugular venous distention [No Lymphadenopathy] : no lymphadenopathy [Supple] : supple [Thyroid Normal, No Nodules] : the thyroid was normal and there were no nodules present [No Respiratory Distress] : no respiratory distress  [No Accessory Muscle Use] : no accessory muscle use [Clear to Auscultation] : lungs were clear to auscultation bilaterally [Normal Rate] : normal rate  [Regular Rhythm] : with a regular rhythm [Normal S1, S2] : normal S1 and S2 [No Murmur] : no murmur heard [No Carotid Bruits] : no carotid bruits [No Abdominal Bruit] : a ~M bruit was not heard ~T in the abdomen [No Varicosities] : no varicosities [Pedal Pulses Present] : the pedal pulses are present [No Edema] : there was no peripheral edema [No Palpable Aorta] : no palpable aorta [No Extremity Clubbing/Cyanosis] : no extremity clubbing/cyanosis [Soft] : abdomen soft [Non Tender] : non-tender [Non-distended] : non-distended [No Masses] : no abdominal mass palpated [No HSM] : no HSM [Normal Bowel Sounds] : normal bowel sounds [Normal Posterior Cervical Nodes] : no posterior cervical lymphadenopathy [Normal Anterior Cervical Nodes] : no anterior cervical lymphadenopathy [No CVA Tenderness] : no CVA  tenderness [No Spinal Tenderness] : no spinal tenderness [No Joint Swelling] : no joint swelling [Grossly Normal Strength/Tone] : grossly normal strength/tone [No Rash] : no rash [Coordination Grossly Intact] : coordination grossly intact [No Focal Deficits] : no focal deficits [Normal Gait] : normal gait [Deep Tendon Reflexes (DTR)] : deep tendon reflexes were 2+ and symmetric [Normal Affect] : the affect was normal [Normal Insight/Judgement] : insight and judgment were intact [de-identified] :  Possible obstruction in biliary duct

## 2022-02-15 NOTE — H&P ADULT - HISTORY OF PRESENT ILLNESS
25 yo obese female with pmh pcos, chronic back pain s/p micro discectomy in 11/2021, vaccinated and boosted presents with rising LFTs and pruritis. Pt was recently dc from [facility] few days ago after she wad endorses epigastric discomfort, generlized itching and was noted to have LFTs in th 100-200 with a t bili on 2.4. At that time, she was ruled out for cholelithiasis but had findings on U/S and CTAP for hepatic steatosis. TG was in the low 200s. She was then discharged and asked to follow up with GI as o/p  Since then, she endorses ongoing epigastric tenderness, facial swelling and itching. No h/o tylenol overuse. No h/o ivda. No transfusions. No Fhx of AI diseases. PT recently started on victoza, phentermine, topamax as part of a wt loss plan. She is also on spirolonactone and tri-lo-suzan for pcos.   She drinks etoh on occasion. No bleeding. No rashes  LFTs were normal in 11/2021 prior to her spinal surgery  Denies exotic travel or foods  Afebrile.       social: social etoh, no drug/tobacco  Shx: as above  fhx: no h/o GI related illnesses

## 2022-02-15 NOTE — ED STATDOCS - PROGRESS NOTE DETAILS
pt aware of labs and spoke to Marry CLANCY from GI recommended MR abd without Contrast. called MRI and they said they will try to do today but cant promise. spoke to hospitalist and will admit for further workup. pt agrees with plan. -Stephy Virk PA-C

## 2022-02-16 LAB
ALBUMIN SERPL ELPH-MCNC: 2.5 G/DL — LOW (ref 3.3–5)
ALP SERPL-CCNC: 263 U/L — HIGH (ref 40–120)
ALT FLD-CCNC: 423 U/L — HIGH (ref 12–78)
ANION GAP SERPL CALC-SCNC: 6 MMOL/L — SIGNIFICANT CHANGE UP (ref 5–17)
AST SERPL-CCNC: 212 U/L — HIGH (ref 15–37)
BILIRUB SERPL-MCNC: 2.6 MG/DL — HIGH (ref 0.2–1.2)
BUN SERPL-MCNC: 5 MG/DL — LOW (ref 7–23)
CALCIUM SERPL-MCNC: 8.8 MG/DL — SIGNIFICANT CHANGE UP (ref 8.5–10.1)
CHLORIDE SERPL-SCNC: 109 MMOL/L — HIGH (ref 96–108)
CO2 SERPL-SCNC: 25 MMOL/L — SIGNIFICANT CHANGE UP (ref 22–31)
CREAT SERPL-MCNC: 0.64 MG/DL — SIGNIFICANT CHANGE UP (ref 0.5–1.3)
CULTURE RESULTS: SIGNIFICANT CHANGE UP
DSDNA AB SER-ACNC: <12 IU/ML — SIGNIFICANT CHANGE UP
EBV EA AB SER IA-ACNC: 71.4 U/ML — HIGH
EBV EA AB TITR SER IF: NEGATIVE — SIGNIFICANT CHANGE UP
EBV EA IGG SER-ACNC: POSITIVE
EBV NA IGG SER IA-ACNC: 3.8 U/ML — SIGNIFICANT CHANGE UP
EBV PATRN SPEC IB-IMP: SIGNIFICANT CHANGE UP
EBV VCA IGG AVIDITY SER QL IA: POSITIVE
EBV VCA IGM SER IA-ACNC: 50.5 U/ML — HIGH
EBV VCA IGM SER IA-ACNC: >160 U/ML — HIGH
EBV VCA IGM TITR FLD: POSITIVE
FERRITIN SERPL-MCNC: 384 NG/ML — HIGH (ref 15–150)
GGT SERPL-CCNC: 127 U/L — HIGH (ref 8–40)
GLUCOSE SERPL-MCNC: 88 MG/DL — SIGNIFICANT CHANGE UP (ref 70–99)
HAV IGM SER-ACNC: SIGNIFICANT CHANGE UP
HBV CORE IGM SER-ACNC: SIGNIFICANT CHANGE UP
HBV SURFACE AG SER-ACNC: SIGNIFICANT CHANGE UP
HCV AB S/CO SERPL IA: 0.15 S/CO — SIGNIFICANT CHANGE UP (ref 0–0.99)
HCV AB SERPL-IMP: SIGNIFICANT CHANGE UP
MITOCHONDRIA AB SER-ACNC: SIGNIFICANT CHANGE UP
POTASSIUM SERPL-MCNC: 3.7 MMOL/L — SIGNIFICANT CHANGE UP (ref 3.5–5.3)
POTASSIUM SERPL-SCNC: 3.7 MMOL/L — SIGNIFICANT CHANGE UP (ref 3.5–5.3)
PROT SERPL-MCNC: 6.8 GM/DL — SIGNIFICANT CHANGE UP (ref 6–8.3)
SMOOTH MUSCLE AB SER-ACNC: ABNORMAL
SODIUM SERPL-SCNC: 140 MMOL/L — SIGNIFICANT CHANGE UP (ref 135–145)
SPECIMEN SOURCE: SIGNIFICANT CHANGE UP

## 2022-02-16 PROCEDURE — 43264 ERCP REMOVE DUCT CALCULI: CPT | Mod: 59

## 2022-02-16 PROCEDURE — 43259 EGD US EXAM DUODENUM/JEJUNUM: CPT | Mod: 59

## 2022-02-16 PROCEDURE — 99233 SBSQ HOSP IP/OBS HIGH 50: CPT

## 2022-02-16 PROCEDURE — 43274 ERCP DUCT STENT PLACEMENT: CPT

## 2022-02-16 RX ORDER — ALPRAZOLAM 0.25 MG
0.25 TABLET ORAL ONCE
Refills: 0 | Status: DISCONTINUED | OUTPATIENT
Start: 2022-02-16 | End: 2022-02-16

## 2022-02-16 RX ORDER — OXYCODONE HYDROCHLORIDE 5 MG/1
5 TABLET ORAL ONCE
Refills: 0 | Status: DISCONTINUED | OUTPATIENT
Start: 2022-02-16 | End: 2022-02-16

## 2022-02-16 RX ORDER — FENTANYL CITRATE 50 UG/ML
50 INJECTION INTRAVENOUS
Refills: 0 | Status: DISCONTINUED | OUTPATIENT
Start: 2022-02-16 | End: 2022-02-16

## 2022-02-16 RX ORDER — DIPHENHYDRAMINE HCL 50 MG
25 CAPSULE ORAL ONCE
Refills: 0 | Status: COMPLETED | OUTPATIENT
Start: 2022-02-16 | End: 2022-02-16

## 2022-02-16 RX ORDER — PROCHLORPERAZINE MALEATE 5 MG
10 TABLET ORAL ONCE
Refills: 0 | Status: COMPLETED | OUTPATIENT
Start: 2022-02-16 | End: 2022-02-16

## 2022-02-16 RX ORDER — ONDANSETRON 8 MG/1
4 TABLET, FILM COATED ORAL ONCE
Refills: 0 | Status: COMPLETED | OUTPATIENT
Start: 2022-02-16 | End: 2022-02-16

## 2022-02-16 RX ORDER — SODIUM CHLORIDE 9 MG/ML
1000 INJECTION, SOLUTION INTRAVENOUS
Refills: 0 | Status: DISCONTINUED | OUTPATIENT
Start: 2022-02-16 | End: 2022-02-16

## 2022-02-16 RX ADMIN — ONDANSETRON 4 MILLIGRAM(S): 8 TABLET, FILM COATED ORAL at 22:48

## 2022-02-16 RX ADMIN — Medication 10 MILLIGRAM(S): at 15:45

## 2022-02-16 RX ADMIN — Medication 25 MILLIGRAM(S): at 22:48

## 2022-02-16 RX ADMIN — ONDANSETRON 4 MILLIGRAM(S): 8 TABLET, FILM COATED ORAL at 15:30

## 2022-02-16 RX ADMIN — Medication 0.25 MILLIGRAM(S): at 08:11

## 2022-02-16 RX ADMIN — Medication 30 MILLILITER(S): at 22:48

## 2022-02-16 NOTE — CONSULT NOTE ADULT - SUBJECTIVE AND OBJECTIVE BOX
Pt is a 27 yo F who was admitted for transaminitis and pruritis. Per pt and pt's parents, pt was complaining of generalized pruritis with facial swelling for the past few weeks. Pt then noted RUQ pain for the past few days, that she thought was gas pain. Associated with tea-colored urine. Does not have a known history of gallstones. Stated after back surgery in November 2021, had normal labs postoperatively. ERCP with sphincterotomy was performed showing sludge in the CBD and a stent was placed.    PAST MEDICAL & SURGICAL HISTORY:  PCOS (polycystic ovarian syndrome)    Ovarian cyst    Vital Signs Last 24 Hrs  T(C): 36.3 (16 Feb 2022 16:25), Max: 36.7 (15 Feb 2022 20:17)  T(F): 97.3 (16 Feb 2022 16:25), Max: 98 (15 Feb 2022 20:17)  HR: 81 (16 Feb 2022 16:25) (81 - 104)  BP: 96/53 (16 Feb 2022 16:25) (96/53 - 132/83)  BP(mean): 63 (16 Feb 2022 16:25) (63 - 63)  RR: 18 (16 Feb 2022 16:25) (18 - 22)  SpO2: 100% (16 Feb 2022 16:25) (97% - 100%)    Physical Exam:  General: AAOx3, in NAD  HEENT: NC/AT, EOMI  Cardio: S1S2, RRR  Pulm: equal air entry b/l, non labored   Abdomen: soft, obese, NT/ND  Extremities: FROM x4    Labs:                        13.7   9.52  )-----------( 172      ( 15 Feb 2022 13:47 )             40.5   02-16    140  |  109<H>  |  5<L>  ----------------------------<  88  3.7   |  25  |  0.64    Imaging:  < from: US Abdomen Upper Quadrant Right (02.15.22 @ 17:43) >    ACC: [id number] EXAM:  US DPLX PORTAL HEPATIC VEIN                        ACC: [id number] EXAM:  US ABDOMEN RT UPR QUADRANT          < end of copied text >  < from: US Abdomen Upper Quadrant Right (02.15.22 @ 17:43) >  Small gallbladder wall polyps.  Normal flow is visualized in the hepatic artery at the jess hepatis, in   the hepatic veins, portal veins, splenic vein and splenoportal   confluence. Normal flow is also seen in the IVC.    < end of copied text >    Ca    8.8      16 Feb 2022 08:11    TPro  6.8  /  Alb  2.5<L>  /  TBili  2.6<H>  /  DBili  x   /  AST  212<H>  /  ALT  423<H>  /  AlkPhos  263<H>  02-16

## 2022-02-16 NOTE — CONSULT NOTE ADULT - ASSESSMENT
25 yo F with possible choledocholithiasis, is s/p ERCP with sphincterotomy and stent placement.    Plan:  -continue diet  -trend LFTs  -serial abdominal exams  -will plan for a laparoscopic cholecystectomy as early as Friday 2/18/22  -pain and nausea control prn  -medical management as per primary team    Plan discussed with Dr. Lomeli

## 2022-02-16 NOTE — BRIEF OPERATIVE NOTE - OPERATION/FINDINGS
Sludge/crystals on EUS.   ERCP with sphincterotomy, balloon sweep of small amount of sludge, placement of stent.

## 2022-02-16 NOTE — BRIEF OPERATIVE NOTE - COMMENTS
If bili's/lft's don't' downtrend significantly, despite removal of sludge, then need to look for alternative causes.  Clear liquid diet today

## 2022-02-17 LAB
ALBUMIN SERPL ELPH-MCNC: 2.3 G/DL — LOW (ref 3.3–5)
ALP SERPL-CCNC: 245 U/L — HIGH (ref 40–120)
ALT FLD-CCNC: 419 U/L — HIGH (ref 12–78)
ANA TITR SER: NEGATIVE — SIGNIFICANT CHANGE UP
AST SERPL-CCNC: 186 U/L — HIGH (ref 15–37)
AUTO DIFF PNL BLD: ABNORMAL
BILIRUB DIRECT SERPL-MCNC: 1.8 MG/DL — HIGH (ref 0–0.3)
BILIRUB INDIRECT FLD-MCNC: 0.4 MG/DL — SIGNIFICANT CHANGE UP (ref 0.2–1)
BILIRUB SERPL-MCNC: 2.2 MG/DL — HIGH (ref 0.2–1.2)
C-ANCA SER-ACNC: NEGATIVE — SIGNIFICANT CHANGE UP
P-ANCA SER-ACNC: NEGATIVE — SIGNIFICANT CHANGE UP
PROT SERPL-MCNC: 6.5 GM/DL — SIGNIFICANT CHANGE UP (ref 6–8.3)

## 2022-02-17 PROCEDURE — 99233 SBSQ HOSP IP/OBS HIGH 50: CPT

## 2022-02-17 RX ORDER — OXYCODONE HYDROCHLORIDE 5 MG/1
5 TABLET ORAL EVERY 6 HOURS
Refills: 0 | Status: DISCONTINUED | OUTPATIENT
Start: 2022-02-17 | End: 2022-02-18

## 2022-02-17 RX ORDER — ACETAMINOPHEN 500 MG
650 TABLET ORAL ONCE
Refills: 0 | Status: COMPLETED | OUTPATIENT
Start: 2022-02-17 | End: 2022-02-17

## 2022-02-17 RX ORDER — DIPHENHYDRAMINE HCL 50 MG
25 CAPSULE ORAL EVERY 6 HOURS
Refills: 0 | Status: DISCONTINUED | OUTPATIENT
Start: 2022-02-17 | End: 2022-02-18

## 2022-02-17 RX ADMIN — Medication 650 MILLIGRAM(S): at 15:30

## 2022-02-17 RX ADMIN — Medication 30 MILLILITER(S): at 21:07

## 2022-02-17 RX ADMIN — Medication 3 MILLIGRAM(S): at 20:59

## 2022-02-17 RX ADMIN — SODIUM CHLORIDE 125 MILLILITER(S): 9 INJECTION INTRAMUSCULAR; INTRAVENOUS; SUBCUTANEOUS at 20:58

## 2022-02-17 RX ADMIN — Medication 650 MILLIGRAM(S): at 14:44

## 2022-02-17 NOTE — PROVIDER CONTACT NOTE (OTHER) - SITUATION
notified Dr Valencia's office of admission please fax over discharge paperwork once patient is discharged to [phone]

## 2022-02-18 ENCOUNTER — TRANSCRIPTION ENCOUNTER (OUTPATIENT)
Age: 27
End: 2022-02-18

## 2022-02-18 VITALS
OXYGEN SATURATION: 97 % | TEMPERATURE: 98 F | HEART RATE: 97 BPM | DIASTOLIC BLOOD PRESSURE: 70 MMHG | RESPIRATION RATE: 17 BRPM | SYSTOLIC BLOOD PRESSURE: 128 MMHG

## 2022-02-18 LAB
ABO RH CONFIRMATION: SIGNIFICANT CHANGE UP
ALBUMIN SERPL ELPH-MCNC: 2.4 G/DL — LOW (ref 3.3–5)
ALP SERPL-CCNC: 285 U/L — HIGH (ref 40–120)
ALT FLD-CCNC: 389 U/L — HIGH (ref 12–78)
ANION GAP SERPL CALC-SCNC: 7 MMOL/L — SIGNIFICANT CHANGE UP (ref 5–17)
APTT BLD: 29 SEC — SIGNIFICANT CHANGE UP (ref 27.5–35.5)
AST SERPL-CCNC: 170 U/L — HIGH (ref 15–37)
BILIRUB DIRECT SERPL-MCNC: 1.7 MG/DL — HIGH (ref 0–0.3)
BILIRUB INDIRECT FLD-MCNC: 0.3 MG/DL — SIGNIFICANT CHANGE UP (ref 0.2–1)
BILIRUB SERPL-MCNC: 2 MG/DL — HIGH (ref 0.2–1.2)
BUN SERPL-MCNC: 4 MG/DL — LOW (ref 7–23)
CALCIUM SERPL-MCNC: 8.9 MG/DL — SIGNIFICANT CHANGE UP (ref 8.5–10.1)
CHLORIDE SERPL-SCNC: 108 MMOL/L — SIGNIFICANT CHANGE UP (ref 96–108)
CO2 SERPL-SCNC: 25 MMOL/L — SIGNIFICANT CHANGE UP (ref 22–31)
CREAT SERPL-MCNC: 0.6 MG/DL — SIGNIFICANT CHANGE UP (ref 0.5–1.3)
GLUCOSE SERPL-MCNC: 93 MG/DL — SIGNIFICANT CHANGE UP (ref 70–99)
HCT VFR BLD CALC: 37.4 % — SIGNIFICANT CHANGE UP (ref 34.5–45)
HGB BLD-MCNC: 12.3 G/DL — SIGNIFICANT CHANGE UP (ref 11.5–15.5)
INR BLD: 1.18 RATIO — HIGH (ref 0.88–1.16)
MAGNESIUM SERPL-MCNC: 2.2 MG/DL — SIGNIFICANT CHANGE UP (ref 1.6–2.6)
MCHC RBC-ENTMCNC: 27.9 PG — SIGNIFICANT CHANGE UP (ref 27–34)
MCHC RBC-ENTMCNC: 32.9 GM/DL — SIGNIFICANT CHANGE UP (ref 32–36)
MCV RBC AUTO: 84.8 FL — SIGNIFICANT CHANGE UP (ref 80–100)
PHOSPHATE SERPL-MCNC: 3.8 MG/DL — SIGNIFICANT CHANGE UP (ref 2.5–4.5)
PLATELET # BLD AUTO: 168 K/UL — SIGNIFICANT CHANGE UP (ref 150–400)
POTASSIUM SERPL-MCNC: 3.5 MMOL/L — SIGNIFICANT CHANGE UP (ref 3.5–5.3)
POTASSIUM SERPL-SCNC: 3.5 MMOL/L — SIGNIFICANT CHANGE UP (ref 3.5–5.3)
PROT SERPL-MCNC: 7 GM/DL — SIGNIFICANT CHANGE UP (ref 6–8.3)
PROTHROM AB SERPL-ACNC: 13.7 SEC — HIGH (ref 10.6–13.6)
RBC # BLD: 4.41 M/UL — SIGNIFICANT CHANGE UP (ref 3.8–5.2)
RBC # FLD: 13.6 % — SIGNIFICANT CHANGE UP (ref 10.3–14.5)
SODIUM SERPL-SCNC: 140 MMOL/L — SIGNIFICANT CHANGE UP (ref 135–145)
WBC # BLD: 8.21 K/UL — SIGNIFICANT CHANGE UP (ref 3.8–10.5)
WBC # FLD AUTO: 8.21 K/UL — SIGNIFICANT CHANGE UP (ref 3.8–10.5)

## 2022-02-18 PROCEDURE — 99232 SBSQ HOSP IP/OBS MODERATE 35: CPT

## 2022-02-18 PROCEDURE — 99239 HOSP IP/OBS DSCHRG MGMT >30: CPT

## 2022-02-18 RX ORDER — TOPIRAMATE 25 MG
1 TABLET ORAL
Qty: 0 | Refills: 0 | DISCHARGE

## 2022-02-18 RX ORDER — LIRAGLUTIDE 6 MG/ML
1.8 INJECTION SUBCUTANEOUS
Qty: 0 | Refills: 0 | DISCHARGE

## 2022-02-18 RX ORDER — ACETAMINOPHEN 500 MG
650 TABLET ORAL ONCE
Refills: 0 | Status: DISCONTINUED | OUTPATIENT
Start: 2022-02-18 | End: 2022-02-18

## 2022-02-18 RX ORDER — ACETAMINOPHEN 500 MG
650 TABLET ORAL ONCE
Refills: 0 | Status: COMPLETED | OUTPATIENT
Start: 2022-02-18 | End: 2022-02-18

## 2022-02-18 RX ORDER — PHENTERMINE HCL 30 MG
1 CAPSULE ORAL
Qty: 0 | Refills: 0 | DISCHARGE

## 2022-02-18 RX ORDER — NORGESTIMATE AND ETHINYL ESTRADIOL 7DAYSX3 LO
1 KIT ORAL
Qty: 0 | Refills: 0 | DISCHARGE

## 2022-02-18 RX ORDER — CX-024414 0.2 MG/ML
0.5 INJECTION, SUSPENSION INTRAMUSCULAR
Qty: 0 | Refills: 0 | DISCHARGE

## 2022-02-18 RX ADMIN — Medication 650 MILLIGRAM(S): at 14:53

## 2022-02-18 NOTE — DISCHARGE NOTE PROVIDER - NSDCMRMEDTOKEN_GEN_ALL_CORE_FT
spironolactone 50 mg oral tablet: 1 tab(s) orally 2 times a day  ***pt  hasn&#x27;t taken in about a week***

## 2022-02-18 NOTE — CONSULT NOTE ADULT - SUBJECTIVE AND OBJECTIVE BOX
Patient is a 26y old  Female who presents with a chief complaint of epigastric pain, abnormal liver function tests    HPI:  27 y/o obese female with h/o chronic back pain s/p micro discectomy in 11/2021, COVID vaccinated and boosted was admitted on 2/15 for increased weakness, itching. She was recently evaluated at [facility] several days PTA after she wad endorses epigastric discomfort, generalized itching and was noted to have LFTs in th 100-200 with a t bili on 2.4. At that time, she was ruled out for cholelithiasis but had findings on U/S and CTAP for hepatic steatosis. TG was in the low 200s. She was then discharged and asked to follow up with GI as o/p. Since then, she endorses ongoing epigastric tenderness, facial swelling and itching. Pt recently started on victoza, phentermine, topamax as part of a wt loss plan.     LFTs were normal in 11/2021 prior to her spinal surgery    PMH: as above  PSH: as above  Meds: per reconciliation sheet, noted below  MEDICATIONS  (STANDING):  spironolactone Oral Tab/Cap - Peds 50 milliGRAM(s) Oral two times a day    MEDICATIONS  (PRN):  aluminum hydroxide/magnesium hydroxide/simethicone Suspension 30 milliLiter(s) Oral every 4 hours PRN Dyspepsia  diphenhydrAMINE 25 milliGRAM(s) Oral every 6 hours PRN Rash and/or Itching  melatonin 3 milliGRAM(s) Oral at bedtime PRN Insomnia  ondansetron Injectable 4 milliGRAM(s) IV Push every 8 hours PRN Nausea and/or Vomiting  oxyCODONE    IR 5 milliGRAM(s) Oral every 6 hours PRN Severe Pain (7 - 10)    Allergies    No Known Allergies    Intolerances      Social: no smoking, no alcohol, no illegal drugs; no recent travel, no exposure to TB  FAMILY HISTORY:    no history of premature cardiovascular disease in first degree relatives    ROS: the patient denies fever, no chills, no HA, no seizures, no dizziness, no sore throat, no nasal congestion, no blurry vision, no CP, no palpitations, no SOB, no cough, no abdominal pain, no diarrhea, no N/V, no dysuria, no leg pain, no claudication, no rash, no joint aches, no rectal pain or bleeding, no night sweats; has increased weakness; has pruritus   All other systems reviewed and are negative    Vital Signs Last 24 Hrs  T(C): 36.1 (18 Feb 2022 05:02), Max: 37.1 (17 Feb 2022 08:11)  T(F): 97 (18 Feb 2022 05:02), Max: 98.8 (17 Feb 2022 08:11)  HR: 97 (18 Feb 2022 05:02) (81 - 101)  BP: 114/55 (18 Feb 2022 05:02) (113/57 - 129/64)  BP(mean): --  RR: 18 (18 Feb 2022 05:02) (18 - 18)  SpO2: 99% (18 Feb 2022 05:02) (98% - 99%)  Daily     Daily     PE:    Constitutional:  No acute distress  HEENT: NC/AT, EOMI, PERRLA, conjunctivae clear; ears and nose atraumatic; pharynx benign  Neck: supple; thyroid not palpable  Back: no tenderness  Respiratory: respiratory effort normal; clear to auscultation  Cardiovascular: S1S2 regular, no murmurs  Abdomen: soft, not tender, not distended, positive BS; no liver or spleen organomegaly  Genitourinary: no suprapubic tenderness  Lymphatic: no LN palpable  Musculoskeletal: no muscle tenderness, no joint swelling or tenderness  Extremities: no pedal edema  Neurological/ Psychiatric: AxOx3, judgement and insight normal; moving all extremities  Skin: no rashes; no palpable lesions    Labs: all available labs reviewed    02-16    140  |  109<H>  |  5<L>  ----------------------------<  88  3.7   |  25  |  0.64    Ca    8.8      16 Feb 2022 08:11    TPro  6.5  /  Alb  2.3<L>  /  TBili  2.2<H>  /  DBili  1.8<H>  /  AST  186<H>  /  ALT  419<H>  /  AlkPhos  245<H>  02-17     LIVER FUNCTIONS - ( 17 Feb 2022 07:39 )  Alb: 2.3 g/dL / Pro: 6.5 gm/dL / ALK PHOS: 245 U/L / ALT: 419 U/L / AST: 186 U/L / GGT: x           EBV VCA IgM EIA: >160.0 U/mL (02.15.22 @ 18:09)     Radiology: all available radiological tests reviewed    `< from: US Abdomen Upper Quadrant Right (02.15.22 @ 17:43) >  Small gallbladder wall polyps.  Normal flow is visualized in the hepatic artery at the jess hepatis, in   the hepatic veins, portal veins, splenic vein and splenoportal   confluence. Normal flow is also seen in the IVC.    < end of copied text >      Advanced directives addressed: full resuscitation

## 2022-02-18 NOTE — DISCHARGE NOTE NURSING/CASE MANAGEMENT/SOCIAL WORK - PATIENT PORTAL LINK FT
You can access the FollowMyHealth Patient Portal offered by [facility] by registering at the following website: [email or web address]. By joining [employer]’s FollowMyHealth portal, you will also be able to view your health information using other applications (apps) compatible with our system.

## 2022-02-18 NOTE — PROGRESS NOTE ADULT - ASSESSMENT
26 year old woman with epigastric pain and elevated LFT's, with sludge in the GB and cbd, s/p ERCP and stenting without significant drop in LFT"s, with EBV acute viral labs coming back positive.     Discussed with Dr. Viramontes, agree that most likely this is acute EBV hepatitis. Will hold off on choly for now, can be done as outpatient.

## 2022-02-18 NOTE — DISCHARGE NOTE PROVIDER - CARE PROVIDER_API CALL
LEROY JOHNSON, Joel,    Phone: ([phone]  Fax: ()-  Follow Up Time: 2 weeks    Nya Rosenberg)  Family Medicine  [address] [place] [address]  Phone: [phone]  Fax: [phone]  Follow Up Time:

## 2022-02-18 NOTE — DISCHARGE NOTE PROVIDER - HOSPITAL COURSE
25 yo obese female with pmh pcos, chronic back pain s/p micro discectomy in 11/2021, vaccinated and boosted presents with rising LFTs and pruritis. Pt was recently dc from [facility] few days ago after she wad endorses epigastric discomfort, generlized itching and was noted to have LFTs in th 100-200 with a t bili on 2.4. At that time, she was ruled out for cholelithiasis but had findings on U/S and CTAP for hepatic steatosis. TG was in the low 200s. She was then discharged and asked to follow up with GI as o/p  Since then, she endorses ongoing epigastric tenderness, facial swelling and itching. No h/o tylenol overuse. No h/o ivda. No transfusions. No Fhx of AI diseases. PT recently started on victoza, phentermine, topamax as part of a wt loss plan. She is also on spirolonactone and tri-lo-suzan for pcos.   She drinks etoh on occasion. No bleeding. No rashes  LFTs were normal in 11/2021 prior to her spinal surgery.  She was admitted to medical floor. He was seen by GI and surgery.   She denies any abd pain. S/P ERCP -removal od CBD sludge and stent placement.  Her serology positive for acute EBV infection. She was seen by DR Viramontes. Her lap choly was cancelled due to acute mono.   She was started on liquid diet and tolerating well. Will advance  diet, if can tolerate can discharge home        Vital Signs Last 24 Hrs  T(C): 36.4 (18 Feb 2022 07:33), Max: 36.8 (17 Feb 2022 15:48)  T(F): 97.6 (18 Feb 2022 07:33), Max: 98.2 (17 Feb 2022 15:48)  HR: 97 (18 Feb 2022 07:33) (81 - 101)  BP: 128/70 (18 Feb 2022 07:33) (113/57 - 129/64)  BP(mean): --  RR: 17 (18 Feb 2022 07:33) (17 - 18)  SpO2: 97% (18 Feb 2022 07:33) (97% - 99%)        Physical Exam:       Constitutional: NAD, awake and alert  HEENT: PERR, EOMI, Normal Hearing, MMM  Neck: Soft and supple, No LAD, No JVD  Respiratory: Breath sounds are clear bilaterally, No wheezing, rales or rhonchi  Cardiovascular: S1 and S2, regular rate and rhythm, no Murmurs, gallops or rubs  Gastrointestinal: Bowel Sounds present, soft, nontender, nondistended, no guarding, no rebound  Extremities: No peripheral edema  Vascular: 2+ peripheral pulses  Neurological: A/O x 3, no focal deficits  Musculoskeletal: 5/5 strength b/l upper and lower extremities  Skin: No rashes.        Assessment and Plan:   Assessment:  · Assessment        #Transaminitis/pruritis  -Acute EBV viral hepatitis  -S/P ERCP: removal of CBD sludge and stent placed  -ID eval for EBV appreciated  -Hold topamax/phentermine/victoza - topamax also has known s/e of liver failure  -GI consult appreciated  -Follow LFTs, slowly resolving. Needs outpatient follow up with PMD and GI    #UTI-asymptomatic    Home today once tolerates diet  Spent more than 30 min to prepare the discharge

## 2022-02-18 NOTE — DISCHARGE NOTE PROVIDER - NSDCCPCAREPLAN_GEN_ALL_CORE_FT
PRINCIPAL DISCHARGE DIAGNOSIS  Diagnosis: Elevated liver function tests  Assessment and Plan of Treatment: Follow up with your PMD and also with Dr Kaur

## 2022-02-18 NOTE — PROGRESS NOTE ADULT - ASSESSMENT
25 yo F with possible choledocholithiasis, is s/p ERCP with sphincterotomy and stent placement.    Plan:  -NPO  -IVF  -trend LFTs  -serial abdominal exams  -will plan for a laparoscopic cholecystectomy today  -pain and nausea control prn  -medical management as per primary team    Plan discussed with Dr. Lomeli

## 2022-02-18 NOTE — PROGRESS NOTE ADULT - ATTENDING COMMENTS
Patient still c/o mild RUQ tenderness. Serology testing reveals viral hepatitis.       Plan  - Adv diet as tolerating to low fat  - No plan for surgical intervention
Patient doing well c/o mild epigastric pain today. LFTs & bilirubin trending down s/p ERCP with stent insertion.       Plan  - clrs for diet today with NPO p MN for Lap. cholecystectomy tomorrow  - Risk & benefit's of surgical intervention d/w patient in laymen's terms

## 2022-02-18 NOTE — DISCHARGE NOTE NURSING/CASE MANAGEMENT/SOCIAL WORK - NSDCPEFALRISK_GEN_ALL_CORE
For information on Fall & Injury Prevention, visit: [email or web address] OR  [email or web address] OR  https://www.cdc.gov/steadi/patient.html

## 2022-02-18 NOTE — CONSULT NOTE ADULT - ASSESSMENT
27 y/o obese female with h/o chronic back pain s/p micro discectomy in 11/2021, COVID vaccinated and boosted was admitted on 2/15 for increased weakness, itching. She was recently evaluated at [facility] several days PTA after she wad endorses epigastric discomfort, generalized itching and was noted to have LFTs in th 100-200 with a t bili on 2.4. At that time, she was ruled out for cholelithiasis but had findings on U/S and CTAP for hepatic steatosis. TG was in the low 200s. She was then discharged and asked to follow up with GI as o/p. Since then, she endorses ongoing epigastric tenderness, facial swelling and itching. Pt recently started on victoza, phentermine, topamax as part of a wt loss plan.    1. Acute infectious mononucleosis with acute hepatitis.   -serology reviewed  -noted with high level EBV Ig M AB level  -likely acute viral EBV infection  -able to tolerate PO intake;   -no need for abx therapy  -old chart reviewed to assess prior cultures  -monitor temps  -f/u CBC  -supportive care  2. Other issues:   -care per medicine

## 2022-02-18 NOTE — PROGRESS NOTE ADULT - SUBJECTIVE AND OBJECTIVE BOX
27 yo obese female with pmh pcos, chronic back pain s/p micro discectomy in 2021, vaccinated and boosted presents with rising LFTs and pruritis. Pt was recently dc from [facility] few days ago after she wad endorses epigastric discomfort, generlized itching and was noted to have LFTs in th 100-200 with a t bili on 2.4. At that time, she was ruled out for cholelithiasis but had findings on U/S and CTAP for hepatic steatosis. TG was in the low 200s. She was then discharged and asked to follow up with GI as o/p  Since then, she endorses ongoing epigastric tenderness, facial swelling and itching. No h/o tylenol overuse. No h/o ivda. No transfusions. No Fhx of AI diseases. PT recently started on victoza, phentermine, topamax as part of a wt loss plan. She is also on spirolonactone and tri-lo-suzan for pcos.   She drinks etoh on occasion. No bleeding. No rashes  LFTs were normal in 2021 prior to her spinal surgery.      22: Patient seen and examined. No new issues. Discussed with patient and parents regarding management plan. Discussed with Dr Kaur, possible EUS today      Vital Signs Last 24 Hrs  T(C): 36.6 (2022 08:05), Max: 36.7 (15 Feb 2022 20:17)  T(F): 97.8 (2022 08:05), Max: 98 (15 Feb 2022 20:17)  HR: 104 (2022 08:05) (97 - 104)  BP: 125/76 (2022 08:05) (119/65 - 125/76)  BP(mean): --  RR: 18 (2022 08:05) (18 - 18)  SpO2: 97% (2022 08:05) (97% - 100%)      Physical Exam:       Constitutional: NAD, awake and alert  HEENT: PERR, EOMI, Normal Hearing, MMM  Neck: Soft and supple, No LAD, No JVD  Respiratory: Breath sounds are clear bilaterally, No wheezing, rales or rhonchi  Cardiovascular: S1 and S2, regular rate and rhythm, no Murmurs, gallops or rubs  Gastrointestinal: Bowel Sounds present, soft, nontender, nondistended, no guarding, no rebound  Extremities: No peripheral edema  Vascular: 2+ peripheral pulses  Neurological: A/O x 3, no focal deficits  Musculoskeletal: 5/5 strength b/l upper and lower extremities  Skin: No rashes.                                13.7   9.52  )-----------( 172      ( 15 Feb 2022 13:47 )             40.5     2022 08:11    140    |  109    |  5      ----------------------------<  88     3.7     |  25     |  0.64     Ca    8.8        2022 08:11    TPro  6.8    /  Alb  2.5    /  TBili  2.6    /  DBili  x      /  AST  212    /  ALT  423    /  AlkPhos  263    2022 08:11    LIVER FUNCTIONS - ( 2022 08:11 )  Alb: 2.5 g/dL / Pro: 6.8 gm/dL / ALK PHOS: 263 U/L / ALT: 423 U/L / AST: 212 U/L / GGT: x           PT/INR - ( 15 Feb 2022 13:47 )   PT: 12.6 sec;   INR: 1.09 ratio         PTT - ( 15 Feb 2022 13:47 )  PTT:28.7 sec  CAPILLARY BLOOD GLUCOSE            Urinalysis Basic - ( 15 Feb 2022 13:28 )    Color: Zenia / Appearance: Slightly Turbid / S.020 / pH: x  Gluc: x / Ketone: Trace  / Bili: Large / Urobili: 8   Blood: x / Protein: 15 / Nitrite: Positive   Leuk Esterase: Trace / RBC: 0-2 /HPF / WBC 0-2   Sq Epi: x / Non Sq Epi: Moderate / Bacteria: Many            MEDICATIONS  (STANDING):  sodium chloride 0.9%. 1000 milliLiter(s) (125 mL/Hr) IV Continuous <Continuous>  spironolactone Oral Tab/Cap - Peds 50 milliGRAM(s) Oral two times a day    MEDICATIONS  (PRN):  aluminum hydroxide/magnesium hydroxide/simethicone Suspension 30 milliLiter(s) Oral every 4 hours PRN Dyspepsia  melatonin 3 milliGRAM(s) Oral at bedtime PRN Insomnia  ondansetron Injectable 4 milliGRAM(s) IV Push every 8 hours PRN Nausea and/or Vomiting          Assessment and Plan:   Assessment:  · Assessment	      #Transaminitis/pruritis  -MRI abdomen could not tolerate  -Possible EUS today  -TICO, Anti-dsDNA, pANCA, anti mitochondrial abx, ferritin, ggt  -Hold topamax/phentermine/victoza - topamax also has known s/e of liver failure  -GI consult appreciated  -Follow LFTs    #UTI-asymptomatic  -D/C ceftriaxone   -Follow urine cultures      DVT px: SCDs/ambulation  
27 yo obese female with pmh pcos, chronic back pain s/p micro discectomy in 2021, vaccinated and boosted presents with rising LFTs and pruritis. Pt was recently dc from [facility] few days ago after she wad endorses epigastric discomfort, generlized itching and was noted to have LFTs in th 100-200 with a t bili on 2.4. At that time, she was ruled out for cholelithiasis but had findings on U/S and CTAP for hepatic steatosis. TG was in the low 200s. She was then discharged and asked to follow up with GI as o/p  Since then, she endorses ongoing epigastric tenderness, facial swelling and itching. No h/o tylenol overuse. No h/o ivda. No transfusions. No Fhx of AI diseases. PT recently started on victoza, phentermine, topamax as part of a wt loss plan. She is also on spirolonactone and tri-lo-suzan for pcos.   She drinks etoh on occasion. No bleeding. No rashes  LFTs were normal in 2021 prior to her spinal surgery.      22: Patient seen and examined. No new issues. Discussed with patient and parents regarding management plan. Discussed with Dr Kaur, possible EUS today  22: She denies any abd pain. S/P ERCP yesterday-removal od CBD sludge and stent placement.       Vital Signs Last 24 Hrs  T(C): 37.1 (2022 08:11), Max: 37.1 (2022 08:11)  T(F): 98.8 (2022 08:11), Max: 98.8 (2022 08:11)  HR: 90 (2022 08:11) (67 - 100)  BP: 121/70 (2022 08:11) (96/53 - 132/83)  BP(mean): 63 (2022 16:25) (63 - 63)  RR: 18 (2022 08:11) (18 - 22)  SpO2: 98% (2022 08:11) (97% - 100%)      Physical Exam:       Constitutional: NAD, awake and alert  HEENT: PERR, EOMI, Normal Hearing, MMM  Neck: Soft and supple, No LAD, No JVD  Respiratory: Breath sounds are clear bilaterally, No wheezing, rales or rhonchi  Cardiovascular: S1 and S2, regular rate and rhythm, no Murmurs, gallops or rubs  Gastrointestinal: Bowel Sounds present, soft, nontender, nondistended, no guarding, no rebound  Extremities: No peripheral edema  Vascular: 2+ peripheral pulses  Neurological: A/O x 3, no focal deficits  Musculoskeletal: 5/5 strength b/l upper and lower extremities  Skin: No rashes.                                               13.7   9.52  )-----------( 172      ( 15 Feb 2022 13:47 )             40.5     2022 08:11    140    |  109    |  5      ----------------------------<  88     3.7     |  25     |  0.64     Ca    8.8        2022 08:11    TPro  6.5    /  Alb  2.3    /  TBili  2.2    /  DBili  1.8    /  AST  186    /  ALT  419    /  AlkPhos  245    2022 07:39    LIVER FUNCTIONS - ( 2022 07:39 )  Alb: 2.3 g/dL / Pro: 6.5 gm/dL / ALK PHOS: 245 U/L / ALT: 419 U/L / AST: 186 U/L / GGT: x           PT/INR - ( 15 Feb 2022 13:47 )   PT: 12.6 sec;   INR: 1.09 ratio         PTT - ( 15 Feb 2022 13:47 )  PTT:28.7 sec  CAPILLARY BLOOD GLUCOSE            Urinalysis Basic - ( 15 Feb 2022 13:28 )    Color: Zenia / Appearance: Slightly Turbid / S.020 / pH: x  Gluc: x / Ketone: Trace  / Bili: Large / Urobili: 8   Blood: x / Protein: 15 / Nitrite: Positive   Leuk Esterase: Trace / RBC: 0-2 /HPF / WBC 0-2   Sq Epi: x / Non Sq Epi: Moderate / Bacteria: Many            MEDICATIONS  (STANDING):  sodium chloride 0.9%. 1000 milliLiter(s) (125 mL/Hr) IV Continuous <Continuous>  spironolactone Oral Tab/Cap - Peds 50 milliGRAM(s) Oral two times a day    MEDICATIONS  (PRN):  aluminum hydroxide/magnesium hydroxide/simethicone Suspension 30 milliLiter(s) Oral every 4 hours PRN Dyspepsia  melatonin 3 milliGRAM(s) Oral at bedtime PRN Insomnia  ondansetron Injectable 4 milliGRAM(s) IV Push every 8 hours PRN Nausea and/or Vomiting          Assessment and Plan:   Assessment:  · Assessment	      #Transaminitis/pruritis  -Suspected EBV viral hepatitis  -S/P ERCP: removal of CBD sludge and stent placed  -ID eval for EBV  -Hold topamax/phentermine/victoza - topamax also has known s/e of liver failure  -GI consult appreciated  -Follow LFTs  -Possible lap choly tomorrow    #UTI-asymptomatic  -D/C ceftriaxone   -Follow urine cultures      DVT px: SCDs/ambulation  
Patient is a 26y old  Female who presents with a chief complaint of abnormal liver function tests (18 Feb 2022 07:16)  PT seen this AM.   This AM patient without acute complaints. Still with some discomfort, but not as bad as when admitted.   Her EBV acute viral tests came back positive.   LFT's not downtrending as significantly as they should with a stent.     MEDICATIONS  (STANDING):  spironolactone Oral Tab/Cap - Peds 50 milliGRAM(s) Oral two times a day    MEDICATIONS  (PRN):  acetaminophen     Tablet .. 650 milliGRAM(s) Oral once PRN Mild Pain (1 - 3)  aluminum hydroxide/magnesium hydroxide/simethicone Suspension 30 milliLiter(s) Oral every 4 hours PRN Dyspepsia  diphenhydrAMINE 25 milliGRAM(s) Oral every 6 hours PRN Rash and/or Itching  melatonin 3 milliGRAM(s) Oral at bedtime PRN Insomnia  ondansetron Injectable 4 milliGRAM(s) IV Push every 8 hours PRN Nausea and/or Vomiting  oxyCODONE    IR 5 milliGRAM(s) Oral every 6 hours PRN Severe Pain (7 - 10)      Vital Signs Last 24 Hrs  T(C): 36.4 (18 Feb 2022 07:33), Max: 36.5 (17 Feb 2022 23:49)  T(F): 97.6 (18 Feb 2022 07:33), Max: 97.7 (17 Feb 2022 23:49)  HR: 97 (18 Feb 2022 07:33) (97 - 101)  BP: 128/70 (18 Feb 2022 07:33) (113/57 - 128/70)  BP(mean): --  RR: 17 (18 Feb 2022 07:33) (17 - 18)  SpO2: 97% (18 Feb 2022 07:33) (97% - 99%)    PHYSICAL EXAM:    Constitutional: No acute distress, well-developed, non-toxic appearing, obese  HEENT: masked, good phonation, not icteric  Neck: supple, no lymphadenopathy  Respiratory: clear to ascultation bilaterally, no wheezing  Cardiovascular: S1 and S2, regular rate and rhythm, no murmurs rubs or gallops  Gastrointestinal: soft, non-tender, non-distended, +bowel sounds, no rebound or guarding, no surgical scars, no drains  Extremities: No peripheral edema, no cyanosis or clubbing  Vascular: 2+ peripheral pulses, no venous stasis  Neurological: A/O x 3, no focal deficits, no asterixis  Psychiatric: Normal mood, normal affect  Skin: No rashes, not jaundiced    LABS:                        12.3   8.21  )-----------( 168      ( 18 Feb 2022 08:21 )             37.4     02-18    140  |  108  |  4<L>  ----------------------------<  93  3.5   |  25  |  0.60    Ca    8.9      18 Feb 2022 08:21  Phos  3.8     02-18  Mg     2.2     02-18    TPro  7.0  /  Alb  2.4<L>  /  TBili  2.0<H>  /  DBili  1.7<H>  /  AST  170<H>  /  ALT  389<H>  /  AlkPhos  285<H>  02-18    PT/INR - ( 18 Feb 2022 08:21 )   PT: 13.7 sec;   INR: 1.18 ratio         PTT - ( 18 Feb 2022 08:21 )  PTT:29.0 sec  LIVER FUNCTIONS - ( 18 Feb 2022 08:21 )  Alb: 2.4 g/dL / Pro: 7.0 gm/dL / ALK PHOS: 285 U/L / ALT: 389 U/L / AST: 170 U/L / GGT: x             
Pt seen and examined at bedside, no acute events overnight. Pt had no complaints. Denied fever, chills, nausea, vomiting or SOB overnight.       Vital Signs Last 24 Hrs  T(C): 36.4 (18 Feb 2022 07:33), Max: 37.1 (17 Feb 2022 08:11)  T(F): 97.6 (18 Feb 2022 07:33), Max: 98.8 (17 Feb 2022 08:11)  HR: 97 (18 Feb 2022 07:33) (81 - 101)  BP: 128/70 (18 Feb 2022 07:33) (113/57 - 129/64)  BP(mean): --  RR: 17 (18 Feb 2022 07:33) (17 - 18)  SpO2: 97% (18 Feb 2022 07:33) (97% - 99%)  Labs:              02-16    140  |  109<H>  |  5<L>  ----------------------------<  88  3.7   |  25  |  0.64    Ca    8.8      16 Feb 2022 08:11    TPro  6.5  /  Alb  2.3<L>  /  TBili  2.2<H>  /  DBili  1.8<H>  /  AST  186<H>  /  ALT  419<H>  /  AlkPhos  245<H>  02-17    LIVER FUNCTIONS - ( 17 Feb 2022 07:39 )  Alb: 2.3 g/dL / Pro: 6.5 gm/dL / ALK PHOS: 245 U/L / ALT: 419 U/L / AST: 186 U/L / GGT: x                       Physical Exam:  General: AAOx3, in NAD  HEENT: NC/AT, EOMI  Cardio: S1S2, RRR  Pulm: equal air entry b/l, non labored   Abdomen: soft, obese, NT/ND  Extremities: FROM x4      
Pt seen and examined at bedside, no acute events overnight. Pt had no complaints. Denied fever, chills, nausea, vomiting or SOB overnight.   ERCP with sphincterotomy was performed showing sludge in the CBD and a stent was placed yesterday.      Physical Exam:  General: AAOx3, in NAD  HEENT: NC/AT, EOMI  Cardio: S1S2, RRR  Pulm: equal air entry b/l, non labored   Abdomen: soft, obese, NT/ND  Extremities: FROM x4      Vital Signs Last 24 Hrs  T(C): 36.8 (16 Feb 2022 23:37), Max: 36.8 (16 Feb 2022 23:37)  T(F): 98.2 (16 Feb 2022 23:37), Max: 98.2 (16 Feb 2022 23:37)  HR: 67 (16 Feb 2022 23:37) (67 - 104)  BP: 119/62 (16 Feb 2022 23:37) (96/53 - 132/83)  BP(mean): 63 (16 Feb 2022 16:25) (63 - 63)  RR: 18 (16 Feb 2022 23:37) (18 - 22)  SpO2: 100% (16 Feb 2022 23:37) (97% - 100%)  Labs:                                13.7   9.52  )-----------( 172      ( 15 Feb 2022 13:47 )             40.5     CBC Full  -  ( 15 Feb 2022 13:47 )  WBC Count : 9.52 K/uL  RBC Count : 4.83 M/uL  Hemoglobin : 13.7 g/dL  Hematocrit : 40.5 %  Platelet Count - Automated : 172 K/uL  Mean Cell Volume : 83.9 fl  Mean Cell Hemoglobin : 28.4 pg  Mean Cell Hemoglobin Concentration : 33.8 gm/dL  Auto Neutrophil # : 1.52 K/uL  Auto Lymphocyte # : 6.85 K/uL  Auto Monocyte # : 0.67 K/uL  Auto Eosinophil # : 0.00 K/uL  Auto Basophil # : 0.00 K/uL  Auto Neutrophil % : 16.0 %  Auto Lymphocyte % : 72.0 %  Auto Monocyte % : 7.0 %  Auto Eosinophil % : 0.0 %  Auto Basophil % : 0.0 %    02-16    140  |  109<H>  |  5<L>  ----------------------------<  88  3.7   |  25  |  0.64    Ca    8.8      16 Feb 2022 08:11    TPro  6.8  /  Alb  2.5<L>  /  TBili  2.6<H>  /  DBili  x   /  AST  212<H>  /  ALT  423<H>  /  AlkPhos  263<H>  02-16    LIVER FUNCTIONS - ( 16 Feb 2022 08:11 )  Alb: 2.5 g/dL / Pro: 6.8 gm/dL / ALK PHOS: 263 U/L / ALT: 423 U/L / AST: 212 U/L / GGT: x           PT/INR - ( 15 Feb 2022 13:47 )   PT: 12.6 sec;   INR: 1.09 ratio         PTT - ( 15 Feb 2022 13:47 )  PTT:28.7 sec

## 2022-02-18 NOTE — DISCHARGE NOTE PROVIDER - CARE PROVIDERS DIRECT ADDRESSES
,DirectAddress_Unknown,[email or web address]

## 2022-02-19 LAB — CMV DNA CSF QL NAA+PROBE: SIGNIFICANT CHANGE UP

## 2022-02-21 LAB
CULTURE RESULTS: SIGNIFICANT CHANGE UP
CULTURE RESULTS: SIGNIFICANT CHANGE UP
SPECIMEN SOURCE: SIGNIFICANT CHANGE UP
SPECIMEN SOURCE: SIGNIFICANT CHANGE UP

## 2022-02-22 DIAGNOSIS — L29.9 PRURITUS, UNSPECIFIED: ICD-10-CM

## 2022-02-22 DIAGNOSIS — E28.2 POLYCYSTIC OVARIAN SYNDROME: ICD-10-CM

## 2022-02-22 DIAGNOSIS — K80.70 CALCULUS OF GALLBLADDER AND BILE DUCT WITHOUT CHOLECYSTITIS WITHOUT OBSTRUCTION: ICD-10-CM

## 2022-02-22 DIAGNOSIS — R17 UNSPECIFIED JAUNDICE: ICD-10-CM

## 2022-02-22 DIAGNOSIS — B19.9 UNSPECIFIED VIRAL HEPATITIS WITHOUT HEPATIC COMA: ICD-10-CM

## 2022-02-22 DIAGNOSIS — B27.09 GAMMAHERPESVIRAL MONONUCLEOSIS WITH OTHER COMPLICATIONS: ICD-10-CM

## 2022-02-22 DIAGNOSIS — N39.0 URINARY TRACT INFECTION, SITE NOT SPECIFIED: ICD-10-CM

## 2022-02-22 DIAGNOSIS — R93.2 ABNORMAL FINDINGS ON DIAGNOSTIC IMAGING OF LIVER AND BILIARY TRACT: ICD-10-CM

## 2022-02-22 DIAGNOSIS — E66.9 OBESITY, UNSPECIFIED: ICD-10-CM

## 2022-02-22 PROBLEM — N83.209 UNSPECIFIED OVARIAN CYST, UNSPECIFIED SIDE: Chronic | Status: ACTIVE | Noted: 2022-02-15

## 2022-02-23 ENCOUNTER — NON-APPOINTMENT (OUTPATIENT)
Age: 27
End: 2022-02-23

## 2022-02-23 ENCOUNTER — APPOINTMENT (OUTPATIENT)
Dept: OBGYN | Facility: CLINIC | Age: 27
End: 2022-02-23
Payer: COMMERCIAL

## 2022-02-23 VITALS
DIASTOLIC BLOOD PRESSURE: 68 MMHG | HEIGHT: 72 IN | WEIGHT: 271 LBS | BODY MASS INDEX: 36.7 KG/M2 | SYSTOLIC BLOOD PRESSURE: 110 MMHG

## 2022-02-23 PROCEDURE — 99213 OFFICE O/P EST LOW 20 MIN: CPT

## 2022-02-24 ENCOUNTER — LABORATORY RESULT (OUTPATIENT)
Age: 27
End: 2022-02-24

## 2022-02-24 ENCOUNTER — APPOINTMENT (OUTPATIENT)
Dept: FAMILY MEDICINE | Facility: CLINIC | Age: 27
End: 2022-02-24
Payer: COMMERCIAL

## 2022-02-24 VITALS
OXYGEN SATURATION: 98 % | WEIGHT: 272 LBS | TEMPERATURE: 97.1 F | HEART RATE: 82 BPM | BODY MASS INDEX: 36.84 KG/M2 | HEIGHT: 72 IN

## 2022-02-24 DIAGNOSIS — K75.9 INFLAMMATORY LIVER DISEASE, UNSPECIFIED: ICD-10-CM

## 2022-02-24 PROCEDURE — 99495 TRANSJ CARE MGMT MOD F2F 14D: CPT

## 2022-02-24 NOTE — HISTORY OF PRESENT ILLNESS
[Oral Contraceptive] : uses oral contraception pills [Y] : Patient is sexually active [N] : Patient denies prior pregnancies [Menarche Age: ____] : age at menarche was [unfilled] [No] : Patient does not have concerns regarding sex [Currently Active] : currently active [TextBox_4] : FOLLOW UP [PapSmeardate] : 09/01/21 [TextBox_31] : neg [GonorrheaDate] : 09/01/21 [TextBox_63] : neg [ChlamydiaDate] : 09/01/21 [TextBox_68] : neg [LMPDate] : 01/28/22 [PGHxTotal] : 0 [FreeTextEntry1] : 01/28/22

## 2022-02-24 NOTE — DISCUSSION/SUMMARY
[FreeTextEntry1] : OCP's refilled\par \par RTO in 4-6 weeks for TV sono \par \par Call parameters reviewed

## 2022-02-24 NOTE — PHYSICAL EXAM
[Appropriately responsive] : appropriately responsive [Alert] : alert [No Acute Distress] : no acute distress [Soft] : soft [Non-tender] : non-tender [Non-distended] : non-distended [No Lesions] : no lesions [No Mass] : no mass [Oriented x3] : oriented x3 [FreeTextEntry7] : no acute abdomen on exam [Labia Majora] : normal [Labia Minora] : normal [Normal] : normal [Uterine Adnexae] : normal

## 2022-02-24 NOTE — END OF VISIT
[FreeTextEntry3] : Medical record entries made by the scribe today today, were at my direction and personally dictated to them by me, Dr. Nya Bone on Feb 24, 2022. I have reviewed the chart and agree that the record accurately reflects my personal performance of the history, physical exam, assessment, and plan.\par

## 2022-02-24 NOTE — HISTORY OF PRESENT ILLNESS
[Post-hospitalization from ___ Hospital] : Post-hospitalization from [unfilled] Hospital [Admitted on: ___] : The patient was admitted on [unfilled] [Discharged on ___] : discharged on [unfilled] [FreeTextEntry2] : Eufemia is a 26 year female here today for HFU. Patient was admitted to [facility] on 02/15/2022 and discharged on 02/18/22. Patient was sent to hospital after seeing Dr. Bone for swelling of the eyes and face, abd pain, Dark tea-colored urine, hives, and full body itchiness. Patient was transported to hospital by her mother. Pt was diagnosed with Transaminitis, pruiritis, EBV + infection.  At hospital, Patient was seen to have rising LFT's and pruritus. Pt was ruled out for cholelithiasis but U/s and CTAP showed possible hepatic steatosis. Pt was seen by ID Dr. Viramontes, hospitalist Dr. Jacob, and GI Dr. Kaur. Patient underwent removal of CBD sludge and stent placement. Serology positive for EBV infection. Pt was started on a liquid diet. Patient presents today feeling better with some abd and back pain. Pt will be f/u with GI. Pt inquired about how long she may be infectious for and about alcohol consumption. Pt stated she has not been taking Topiramate 50 MG and asked about c/w Spironolactone 50 MG.  Pt also asked about continuing with birth control medications. Pt has not been taking Tylenol.

## 2022-02-24 NOTE — PLAN
[FreeTextEntry1] : - Labs drawn in office today\par - Adv to rest/avoid trauma to abdomen\par - Hold on all prescribed meds EXCEPT Tri-Lo-Sprintec 0.18\par - Adv to see Thoracic surgeon Dr. Ibanez regarding Pulmonary nodules \par - Pt may take Advil instead of Tylenol \par - f/u in 3-4 weeks

## 2022-02-25 LAB
ALBUMIN SERPL ELPH-MCNC: 3.8 G/DL
ALP BLD-CCNC: 214 U/L
ALT SERPL-CCNC: 168 U/L
ANION GAP SERPL CALC-SCNC: 14 MMOL/L
AST SERPL-CCNC: 98 U/L
BASOPHILS # BLD AUTO: 0.05 K/UL
BASOPHILS NFR BLD AUTO: 0.9 %
BILIRUB DIRECT SERPL-MCNC: 0.5 MG/DL
BILIRUB SERPL-MCNC: 0.9 MG/DL
BUN SERPL-MCNC: 8 MG/DL
CALCIUM SERPL-MCNC: 9.6 MG/DL
CHLORIDE SERPL-SCNC: 102 MMOL/L
CO2 SERPL-SCNC: 22 MMOL/L
CREAT SERPL-MCNC: 0.62 MG/DL
EOSINOPHIL # BLD AUTO: 0.14 K/UL
EOSINOPHIL NFR BLD AUTO: 2.7 %
GGT SERPL-CCNC: 70 U/L
GLUCOSE SERPL-MCNC: 78 MG/DL
HCT VFR BLD CALC: 39.1 %
HGB BLD-MCNC: 12.6 G/DL
LYMPHOCYTES # BLD AUTO: 2.67 K/UL
LYMPHOCYTES NFR BLD AUTO: 52.6 %
MAN DIFF?: NORMAL
MCHC RBC-ENTMCNC: 27.8 PG
MCHC RBC-ENTMCNC: 32.2 GM/DL
MCV RBC AUTO: 86.1 FL
MONOCYTES # BLD AUTO: 0.36 K/UL
MONOCYTES NFR BLD AUTO: 7 %
NEUTROPHILS # BLD AUTO: 1.34 K/UL
NEUTROPHILS NFR BLD AUTO: 26.3 %
PLATELET # BLD AUTO: 279 K/UL
POTASSIUM SERPL-SCNC: 4 MMOL/L
PROT SERPL-MCNC: 6.9 G/DL
RBC # BLD: 4.54 M/UL
RBC # FLD: 13.2 %
SODIUM SERPL-SCNC: 139 MMOL/L
WBC # FLD AUTO: 5.08 K/UL

## 2022-03-17 ENCOUNTER — APPOINTMENT (OUTPATIENT)
Dept: FAMILY MEDICINE | Facility: CLINIC | Age: 27
End: 2022-03-17
Payer: COMMERCIAL

## 2022-03-17 VITALS
BODY MASS INDEX: 38.06 KG/M2 | TEMPERATURE: 97.9 F | HEART RATE: 75 BPM | SYSTOLIC BLOOD PRESSURE: 110 MMHG | HEIGHT: 72 IN | OXYGEN SATURATION: 100 % | DIASTOLIC BLOOD PRESSURE: 70 MMHG | WEIGHT: 281 LBS

## 2022-03-17 DIAGNOSIS — B27.90 INFECTIOUS MONONUCLEOSIS, UNSPECIFIED W/OUT COMPLICATION: ICD-10-CM

## 2022-03-17 PROCEDURE — 99214 OFFICE O/P EST MOD 30 MIN: CPT

## 2022-03-17 NOTE — END OF VISIT
[FreeTextEntry3] : Medical record entries made by the scribe today today, were at my direction and personally dictated to them by me, Dr. Nya Bone on March 17, 2022. I have reviewed the chart and agree that the record accurately reflects my personal performance of the history, physical exam, assessment, and plan.\par

## 2022-03-17 NOTE — PLAN
[FreeTextEntry1] : - Labs drawn in office today\par - Pt cleared to return to all usual activities pending labs \par - Advised to have specialists check bw every 3 months or return to office \par - Follow up for CPE in Summer or sooner as needed

## 2022-03-17 NOTE — HISTORY OF PRESENT ILLNESS
[FreeTextEntry1] : ADALBERTO is a 26 year female here for follow up on elevated liver enzymes\par \par  [de-identified] : Eufemia is a 26 year female here today for f/u on bw. Pt reported she feels good today and denies any complaints. Pt stated she has not consumed any alcohol. Patient has been only taking birth control medication since hospital visit.\par

## 2022-03-18 LAB
ALBUMIN SERPL ELPH-MCNC: 4.2 G/DL
ALP BLD-CCNC: 73 U/L
ALT SERPL-CCNC: 26 U/L
ANION GAP SERPL CALC-SCNC: 13 MMOL/L
AST SERPL-CCNC: 22 U/L
BASOPHILS # BLD AUTO: 0.02 K/UL
BASOPHILS NFR BLD AUTO: 0.5 %
BILIRUB SERPL-MCNC: 0.4 MG/DL
BUN SERPL-MCNC: 8 MG/DL
CALCIUM SERPL-MCNC: 9.8 MG/DL
CHLORIDE SERPL-SCNC: 105 MMOL/L
CO2 SERPL-SCNC: 21 MMOL/L
CREAT SERPL-MCNC: 0.66 MG/DL
EGFR: 122 ML/MIN/1.73M2
EOSINOPHIL # BLD AUTO: 0.11 K/UL
EOSINOPHIL NFR BLD AUTO: 2.7 %
GLUCOSE SERPL-MCNC: 79 MG/DL
HCT VFR BLD CALC: 39.3 %
HGB BLD-MCNC: 12.3 G/DL
IMM GRANULOCYTES NFR BLD AUTO: 0.2 %
LYMPHOCYTES # BLD AUTO: 1.51 K/UL
LYMPHOCYTES NFR BLD AUTO: 37 %
MAN DIFF?: NORMAL
MCHC RBC-ENTMCNC: 28 PG
MCHC RBC-ENTMCNC: 31.3 GM/DL
MCV RBC AUTO: 89.3 FL
MONOCYTES # BLD AUTO: 0.35 K/UL
MONOCYTES NFR BLD AUTO: 8.6 %
NEUTROPHILS # BLD AUTO: 2.08 K/UL
NEUTROPHILS NFR BLD AUTO: 51 %
PLATELET # BLD AUTO: 236 K/UL
POTASSIUM SERPL-SCNC: 4.6 MMOL/L
PROT SERPL-MCNC: 7.1 G/DL
RBC # BLD: 4.4 M/UL
RBC # FLD: 13.7 %
SODIUM SERPL-SCNC: 139 MMOL/L
WBC # FLD AUTO: 4.08 K/UL

## 2022-04-13 ENCOUNTER — APPOINTMENT (OUTPATIENT)
Dept: GASTROENTEROLOGY | Facility: CLINIC | Age: 27
End: 2022-04-13
Payer: COMMERCIAL

## 2022-04-13 VITALS
DIASTOLIC BLOOD PRESSURE: 71 MMHG | WEIGHT: 280 LBS | SYSTOLIC BLOOD PRESSURE: 121 MMHG | HEIGHT: 72 IN | HEART RATE: 59 BPM | BODY MASS INDEX: 37.93 KG/M2

## 2022-04-13 DIAGNOSIS — R74.8 ABNORMAL LEVELS OF OTHER SERUM ENZYMES: ICD-10-CM

## 2022-04-13 PROCEDURE — 99214 OFFICE O/P EST MOD 30 MIN: CPT

## 2022-04-14 ENCOUNTER — APPOINTMENT (OUTPATIENT)
Dept: OBGYN | Facility: CLINIC | Age: 27
End: 2022-04-14
Payer: COMMERCIAL

## 2022-04-14 ENCOUNTER — APPOINTMENT (OUTPATIENT)
Dept: ANTEPARTUM | Facility: CLINIC | Age: 27
End: 2022-04-14
Payer: COMMERCIAL

## 2022-04-14 ENCOUNTER — ASOB RESULT (OUTPATIENT)
Age: 27
End: 2022-04-14

## 2022-04-14 VITALS
WEIGHT: 270 LBS | BODY MASS INDEX: 36.57 KG/M2 | SYSTOLIC BLOOD PRESSURE: 122 MMHG | HEIGHT: 72 IN | DIASTOLIC BLOOD PRESSURE: 70 MMHG

## 2022-04-14 PROCEDURE — 76830 TRANSVAGINAL US NON-OB: CPT

## 2022-04-14 PROCEDURE — 99213 OFFICE O/P EST LOW 20 MIN: CPT

## 2022-04-14 NOTE — HISTORY OF PRESENT ILLNESS
[Gonorrhea test offered] : Gonorrhea test offered [Chlamydia test offered] : Chlamydia test offered [Oral Contraceptive] : uses oral contraception pills [Y] : Patient is sexually active [N] : Patient denies prior pregnancies [Menarche Age: ____] : age at menarche was [unfilled] [No] : Patient does not have concerns regarding sex [Currently Active] : currently active [TextBox_4] : Pt presents for follow up pelvic sono to review results [BreastSonogramDate] : 12/08/15 [PapSmeardate] : 09/01/21 [TextBox_31] : neg [GonorrheaDate] : 09/01/21 [TextBox_63] : neg [ChlamydiaDate] : 09/01/21 [TextBox_68] : neg [LMPDate] : 03/20/22 [PGHxTotal] : 0 [FreeTextEntry1] : 03/20/22

## 2022-04-14 NOTE — DISCUSSION/SUMMARY
[FreeTextEntry1] : -h/o right adnexal cyst on CT scan in 2/2022-\par 1) Pelvic CT on 2/12/22 was significant for: Right adnexal cyst measuring 5.8 x 4.5 x 4.5 cm. Circumscribed lesion with fluid attenuation, likely representing an ovarian cyst. \par 2) Pelvic sonogram today showed: Retroverted uterus measuring 5.52 cm. EMS measures: 5.9 mm. Right ovary contains simple cyst measuring 2.14 cm. Left ovary contains a complex cyst measuring 2.9 cm. Trace of left adnexal/ posterior free fluid noted. Results were discussed.\par 3) Repeat pelvic sonogram in 3 months to follow up.\par 4) Ovarian torsion precautions and call parameters were reviewed.\par \par -Last annual GYN exam was in 9/2021.\par \par -Follow up in 3 months with pelvic sono or PRN. \par \par All questions and concerns were discussed.

## 2022-04-18 PROBLEM — R74.8 ELEVATED LIVER ENZYMES: Status: ACTIVE | Noted: 2022-02-15

## 2022-04-18 NOTE — HISTORY OF PRESENT ILLNESS
[de-identified] : 26 year old woman admitted to the hospital with RUQ pain, found to have sludge s/p ERCP, also with EBV hepatitis, here for follow up. \par \par Patient doing well without any acute complaints. No abdominal pain. No post prandial pain or bloating. No nausea or vomiting. No jaundice. \par \par

## 2022-04-18 NOTE — PHYSICAL EXAM
[General Appearance - Alert] : alert [General Appearance - In No Acute Distress] : in no acute distress [PERRL With Normal Accommodation] : pupils were equal in size, round, and reactive to light [Sclera] : the sclera and conjunctiva were normal [Extraocular Movements] : extraocular movements were intact [Abdomen Soft] : soft [Nail Clubbing] : no clubbing  or cyanosis of the fingernails [Abnormal Walk] : normal gait [Musculoskeletal - Swelling] : no joint swelling seen [Skin Color & Pigmentation] : normal skin color and pigmentation [Motor Tone] : muscle strength and tone were normal [Skin Turgor] : normal skin turgor [] : no rash [No Focal Deficits] : no focal deficits [Motor Exam] : the motor exam was normal [Oriented To Time, Place, And Person] : oriented to person, place, and time [Impaired Insight] : insight and judgment were intact [Affect] : the affect was normal

## 2022-05-06 ENCOUNTER — APPOINTMENT (OUTPATIENT)
Dept: PULMONOLOGY | Facility: CLINIC | Age: 27
End: 2022-05-06
Payer: COMMERCIAL

## 2022-05-06 VITALS
HEART RATE: 98 BPM | DIASTOLIC BLOOD PRESSURE: 72 MMHG | RESPIRATION RATE: 16 BRPM | BODY MASS INDEX: 35.21 KG/M2 | WEIGHT: 260 LBS | OXYGEN SATURATION: 99 % | HEIGHT: 72 IN | SYSTOLIC BLOOD PRESSURE: 116 MMHG

## 2022-05-06 PROCEDURE — 99203 OFFICE O/P NEW LOW 30 MIN: CPT

## 2022-05-06 RX ORDER — SPIRONOLACTONE 50 MG/1
50 TABLET ORAL TWICE DAILY
Refills: 0 | Status: DISCONTINUED | COMMUNITY
End: 2022-05-06

## 2022-05-06 NOTE — PHYSICAL EXAM
[Normal Oropharynx] : normal oropharynx [III] : Mallampati Class: III [Supple] : supple [No Neck Mass] : no neck mass [No JVD] : no jvd [Normal S1, S2] : normal s1, s2 [No Murmurs] : no murmurs [Not Tender] : not tender [No Masses] : no masses [Soft] : soft [Normal Bowel Sounds] : normal bowel sounds [Normal Gait] : normal gait [No Clubbing] : no clubbing [No Edema] : no edema [No Rash] : no rash [No Motor Deficits] : no motor deficits [Normal Affect] : normal affect [TextBox_2] : pleasant f no distress speaking full sentences no cough  [TextBox_68] : bilateral air entry  no  w/r/r

## 2022-05-06 NOTE — REVIEW OF SYSTEMS
[Obesity] : obesity [Fever] : no fever [Fatigue] : no fatigue [Recent Wt Gain (___ Lbs)] : ~T no recent weight gain [Chills] : no chills [Recent Wt Loss (___ Lbs)] : ~T no recent weight loss [Dry Eyes] : no dry eyes [Epistaxis] : no epistaxis [Sore Throat] : no sore throat [Cough] : no cough [Hemoptysis] : no hemoptysis [Chest Tightness] : no chest tightness [Sputum] : no sputum [Dyspnea] : no dyspnea [Wheezing] : no wheezing [SOB on Exertion] : no sob on exertion [Chest Discomfort] : no chest discomfort [Seasonal Allergies] : no seasonal allergies [GERD] : no gerd [Abdominal Pain] : no abdominal pain [Nausea] : no nausea [Vomiting] : no vomiting [Constipation] : no constipation [Dysphagia] : no dysphagia [Bleeding] : no bleeding [Myalgias] : no myalgias [Chronic Pain] : no chronic pain [Rash] : no rash [Blood Transfusion] : no blood transfusion [Clotting Disorder/ Frequent bleeding] : no clotting disorder/ frequent bleeding [Depression] : no depression [Anxiety] : no anxiety [Panic Attacks] : no panic attacks [Diabetes] : no diabetes [Thyroid Problem] : no thyroid problem

## 2022-05-06 NOTE — ASSESSMENT
[FreeTextEntry1] : 25y/o female never smoker:\par \par 1- abd ct 2/2022   2 mm and 3 mm  RML  nodule  ?\par 2- h/o  Momo bar virus  2/2022\par 3- vaccinations    covid +  ( covid 12/2020) \par 4- obesity \par \par REcommendations\par 1- cxr pa/lateral \par 2- f/u  ct chest    8/2022 compare size  ( low risk ) \par 3-weight loss  encouraged\par \par follow up  after ct i n 8/2022   Dr Tinsley   patient and mother agree and understand above

## 2022-06-16 ENCOUNTER — OUTPATIENT (OUTPATIENT)
Dept: OUTPATIENT SERVICES | Facility: HOSPITAL | Age: 27
LOS: 1 days | End: 2022-06-16
Payer: COMMERCIAL

## 2022-06-16 VITALS
SYSTOLIC BLOOD PRESSURE: 114 MMHG | WEIGHT: 268.96 LBS | HEIGHT: 72 IN | TEMPERATURE: 99 F | RESPIRATION RATE: 16 BRPM | OXYGEN SATURATION: 100 % | DIASTOLIC BLOOD PRESSURE: 72 MMHG | HEART RATE: 82 BPM

## 2022-06-16 DIAGNOSIS — Z98.890 OTHER SPECIFIED POSTPROCEDURAL STATES: Chronic | ICD-10-CM

## 2022-06-16 DIAGNOSIS — Z96.89 PRESENCE OF OTHER SPECIFIED FUNCTIONAL IMPLANTS: ICD-10-CM

## 2022-06-16 DIAGNOSIS — Z01.818 ENCOUNTER FOR OTHER PREPROCEDURAL EXAMINATION: ICD-10-CM

## 2022-06-16 DIAGNOSIS — Z98.890 OTHER SPECIFIED POSTPROCEDURAL STATES: ICD-10-CM

## 2022-06-16 LAB
ANION GAP SERPL CALC-SCNC: 5 MMOL/L — SIGNIFICANT CHANGE UP (ref 5–17)
APTT BLD: 25.1 SEC — LOW (ref 27.5–35.5)
BASOPHILS # BLD AUTO: 0.02 K/UL — SIGNIFICANT CHANGE UP (ref 0–0.2)
BASOPHILS NFR BLD AUTO: 0.4 % — SIGNIFICANT CHANGE UP (ref 0–2)
BUN SERPL-MCNC: 8 MG/DL — SIGNIFICANT CHANGE UP (ref 7–23)
CALCIUM SERPL-MCNC: 9.4 MG/DL — SIGNIFICANT CHANGE UP (ref 8.5–10.1)
CHLORIDE SERPL-SCNC: 112 MMOL/L — HIGH (ref 96–108)
CO2 SERPL-SCNC: 23 MMOL/L — SIGNIFICANT CHANGE UP (ref 22–31)
CREAT SERPL-MCNC: 0.84 MG/DL — SIGNIFICANT CHANGE UP (ref 0.5–1.3)
EGFR: 97 ML/MIN/1.73M2 — SIGNIFICANT CHANGE UP
EOSINOPHIL # BLD AUTO: 0.18 K/UL — SIGNIFICANT CHANGE UP (ref 0–0.5)
EOSINOPHIL NFR BLD AUTO: 3.4 % — SIGNIFICANT CHANGE UP (ref 0–6)
GLUCOSE SERPL-MCNC: 81 MG/DL — SIGNIFICANT CHANGE UP (ref 70–99)
HCT VFR BLD CALC: 40.3 % — SIGNIFICANT CHANGE UP (ref 34.5–45)
HGB BLD-MCNC: 13.5 G/DL — SIGNIFICANT CHANGE UP (ref 11.5–15.5)
IMM GRANULOCYTES NFR BLD AUTO: 0.2 % — SIGNIFICANT CHANGE UP (ref 0–1.5)
INR BLD: 1.12 RATIO — SIGNIFICANT CHANGE UP (ref 0.88–1.16)
LYMPHOCYTES # BLD AUTO: 1.53 K/UL — SIGNIFICANT CHANGE UP (ref 1–3.3)
LYMPHOCYTES # BLD AUTO: 28.9 % — SIGNIFICANT CHANGE UP (ref 13–44)
MCHC RBC-ENTMCNC: 27.5 PG — SIGNIFICANT CHANGE UP (ref 27–34)
MCHC RBC-ENTMCNC: 33.5 GM/DL — SIGNIFICANT CHANGE UP (ref 32–36)
MCV RBC AUTO: 82.1 FL — SIGNIFICANT CHANGE UP (ref 80–100)
MONOCYTES # BLD AUTO: 0.34 K/UL — SIGNIFICANT CHANGE UP (ref 0–0.9)
MONOCYTES NFR BLD AUTO: 6.4 % — SIGNIFICANT CHANGE UP (ref 2–14)
NEUTROPHILS # BLD AUTO: 3.21 K/UL — SIGNIFICANT CHANGE UP (ref 1.8–7.4)
NEUTROPHILS NFR BLD AUTO: 60.7 % — SIGNIFICANT CHANGE UP (ref 43–77)
PLATELET # BLD AUTO: 287 K/UL — SIGNIFICANT CHANGE UP (ref 150–400)
POTASSIUM SERPL-MCNC: 3.9 MMOL/L — SIGNIFICANT CHANGE UP (ref 3.5–5.3)
POTASSIUM SERPL-SCNC: 3.9 MMOL/L — SIGNIFICANT CHANGE UP (ref 3.5–5.3)
PROTHROM AB SERPL-ACNC: 13 SEC — SIGNIFICANT CHANGE UP (ref 10.5–13.4)
RBC # BLD: 4.91 M/UL — SIGNIFICANT CHANGE UP (ref 3.8–5.2)
RBC # FLD: 13.3 % — SIGNIFICANT CHANGE UP (ref 10.3–14.5)
SODIUM SERPL-SCNC: 140 MMOL/L — SIGNIFICANT CHANGE UP (ref 135–145)
WBC # BLD: 5.29 K/UL — SIGNIFICANT CHANGE UP (ref 3.8–10.5)
WBC # FLD AUTO: 5.29 K/UL — SIGNIFICANT CHANGE UP (ref 3.8–10.5)

## 2022-06-16 PROCEDURE — 36415 COLL VENOUS BLD VENIPUNCTURE: CPT

## 2022-06-16 PROCEDURE — 93005 ELECTROCARDIOGRAM TRACING: CPT

## 2022-06-16 PROCEDURE — 85025 COMPLETE CBC W/AUTO DIFF WBC: CPT

## 2022-06-16 PROCEDURE — 85730 THROMBOPLASTIN TIME PARTIAL: CPT

## 2022-06-16 PROCEDURE — 93010 ELECTROCARDIOGRAM REPORT: CPT

## 2022-06-16 PROCEDURE — 85610 PROTHROMBIN TIME: CPT

## 2022-06-16 PROCEDURE — 80048 BASIC METABOLIC PNL TOTAL CA: CPT

## 2022-06-16 NOTE — H&P PST ADULT - NSICDXPASTMEDICALHX_GEN_ALL_CORE_FT
PAST MEDICAL HISTORY:  2019 novel coronavirus disease (COVID-19) Dec 2020 pt was not hospitalized.    Elevated liver enzymes Feb 2022    H/O acute cholecystitis Feb 2022    History of mononucleosis Feb 2022    Obesity     Ovarian cyst     PCOS (polycystic ovarian syndrome)

## 2022-06-16 NOTE — H&P PST ADULT - ASSESSMENT
26 yrs old female with h/o mono in feb 2022 and had cholecystitis and elevated  liver enzymes along with abdominal pain. Pt had stent inserted Feb 2022. pt presents for preop eval to have ERCP and stent removal.

## 2022-06-16 NOTE — H&P PST ADULT - NSICDXPASTSURGICALHX_GEN_ALL_CORE_FT
PAST SURGICAL HISTORY:  H/O microdiscectomy Nov 2021    History of ERCP Feb 2022 and stent placement

## 2022-06-16 NOTE — H&P PST ADULT - PROBLEM SELECTOR PLAN 1
26 yrs old female with h/o mono in feb 2022 and had cholecystitis and elevated  liver enzymes along with abdominal pain. Pt had stent inserted Feb 2022. pt presents for preop eval to have ERCP and stent removal.    Plan:  1. PST instructions given ; NPO status/  instructions to be given by ASU   2. Pt instructed to take routine evening medications unless indicated   4. Stop NSAIDS ( Aspirin Alev Motrin Mobic Diclofenac), herbal supplements , MVI , Vitamin fish oil 7 days prior to surgery  unless   directed by surgeon or cardiologist;   5. Medical Optimization  with Dr. Rosenberg  6. EZ wash instructions given   7. Labs EKG  as per surgeon request   8. Pt instructed to self quarantine after Covid test   9. Covid Testing scheduled Pt notified and aware  10. Pt denies covid symptoms shortness of breath fever cough

## 2022-06-16 NOTE — H&P PST ADULT - RESPIRATORY
normal/clear to auscultation bilaterally/no wheezes/no rales/no rhonchi/no respiratory distress/respirations non-labored

## 2022-06-16 NOTE — H&P PST ADULT - NSANTHOSAYNRD_GEN_A_CORE
never tested/No. BRIAN screening performed.  STOP BANG Legend: 0-2 = LOW Risk; 3-4 = INTERMEDIATE Risk; 5-8 = HIGH Risk

## 2022-06-16 NOTE — H&P PST ADULT - GASTROINTESTINAL COMMENTS
h/o nausea and abdominal pain in Feb 2022, diagnosed with cholecystitis had ERCP and biliary stent was inserted.

## 2022-06-17 DIAGNOSIS — Z96.89 PRESENCE OF OTHER SPECIFIED FUNCTIONAL IMPLANTS: ICD-10-CM

## 2022-06-17 DIAGNOSIS — Z01.818 ENCOUNTER FOR OTHER PREPROCEDURAL EXAMINATION: ICD-10-CM

## 2022-06-17 NOTE — H&P PST ADULT - TEMPERATURE IN CELSIUS (DEGREES C)
Erythromycin Counseling:  I discussed with the patient the risks of erythromycin including but not limited to GI upset, allergic reaction, drug rash, diarrhea, increase in liver enzymes, and yeast infections.
Benzoyl Peroxide Counseling: Patient counseled that medicine may cause skin irritation and bleach clothing.  In the event of skin irritation, the patient was advised to reduce the amount of the drug applied or use it less frequently.   The patient verbalized understanding of the proper use and possible adverse effects of benzoyl peroxide.  All of the patient's questions and concerns were addressed.
Tazorac Counseling:  Patient advised that medication is irritating and drying.  Patient may need to apply sparingly and wash off after an hour before eventually leaving it on overnight.  The patient verbalized understanding of the proper use and possible adverse effects of tazorac.  All of the patient's questions and concerns were addressed.
Sarecycline Pregnancy And Lactation Text: This medication is Pregnancy Category D and not consider safe during pregnancy. It is also excreted in breast milk.
Winlevi Pregnancy And Lactation Text: This medication is considered safe during pregnancy and breastfeeding.
Birth Control Pills Pregnancy And Lactation Text: This medication should be avoided if pregnant and for the first 30 days post-partum.
Doxycycline Pregnancy And Lactation Text: This medication is Pregnancy Category D and not consider safe during pregnancy. It is also excreted in breast milk but is considered safe for shorter treatment courses.
Azithromycin Counseling:  I discussed with the patient the risks of azithromycin including but not limited to GI upset, allergic reaction, drug rash, diarrhea, and yeast infections.
High Dose Vitamin A Counseling: Side effects reviewed, pt to contact office should one occur.
Bactrim Counseling:  I discussed with the patient the risks of sulfa antibiotics including but not limited to GI upset, allergic reaction, drug rash, diarrhea, dizziness, photosensitivity, and yeast infections.  Rarely, more serious reactions can occur including but not limited to aplastic anemia, agranulocytosis, methemoglobinemia, blood dyscrasias, liver or kidney failure, lung infiltrates or desquamative/blistering drug rashes.
Dapsone Pregnancy And Lactation Text: This medication is Pregnancy Category C and is not considered safe during pregnancy or breast feeding.
Azelaic Acid Counseling: Patient counseled that medicine may cause skin irritation and to avoid applying near the eyes.  In the event of skin irritation, the patient was advised to reduce the amount of the drug applied or use it less frequently.   The patient verbalized understanding of the proper use and possible adverse effects of azelaic acid.  All of the patient's questions and concerns were addressed.
Spironolactone Counseling: Patient advised regarding risks of diarrhea, abdominal pain, hyperkalemia, birth defects (for female patients), liver toxicity and renal toxicity. The patient may need blood work to monitor liver and kidney function and potassium levels while on therapy. The patient verbalized understanding of the proper use and possible adverse effects of spironolactone.  All of the patient's questions and concerns were addressed.
Erythromycin Pregnancy And Lactation Text: This medication is Pregnancy Category B and is considered safe during pregnancy. It is also excreted in breast milk.
Tazorac Pregnancy And Lactation Text: This medication is not safe during pregnancy. It is unknown if this medication is excreted in breast milk.
Topical Sulfur Applications Pregnancy And Lactation Text: This medication is Pregnancy Category C and has an unknown safety profile during pregnancy. It is unknown if this topical medication is excreted in breast milk.
Azithromycin Pregnancy And Lactation Text: This medication is considered safe during pregnancy and is also secreted in breast milk.
High Dose Vitamin A Pregnancy And Lactation Text: High dose vitamin A therapy is contraindicated during pregnancy and breast feeding.
Dapsone Counseling: I discussed with the patient the risks of dapsone including but not limited to hemolytic anemia, agranulocytosis, rashes, methemoglobinemia, kidney failure, peripheral neuropathy, headaches, GI upset, and liver toxicity.  Patients who start dapsone require monitoring including baseline LFTs and weekly CBCs for the first month, then every month thereafter.  The patient verbalized understanding of the proper use and possible adverse effects of dapsone.  All of the patient's questions and concerns were addressed.
37
Detail Level: Zone
Topical Retinoid counseling:  Patient advised to apply a pea-sized amount only at bedtime and wait 30 minutes after washing their face before applying.  If too drying, patient may add a non-comedogenic moisturizer. The patient verbalized understanding of the proper use and possible adverse effects of retinoids.  All of the patient's questions and concerns were addressed.
Isotretinoin Counseling: Patient should get monthly blood tests, not donate blood, not drive at night if vision affected, not share medication, and not undergo elective surgery for 6 months after tx completed. Side effects reviewed, pt to contact office should one occur.
Azelaic Acid Pregnancy And Lactation Text: This medication is considered safe during pregnancy and breast feeding.
Bactrim Pregnancy And Lactation Text: This medication is Pregnancy Category D and is known to cause fetal risk.  It is also excreted in breast milk.
Use Enhanced Medication Counseling?: No
Spironolactone Pregnancy And Lactation Text: This medication can cause feminization of the male fetus and should be avoided during pregnancy. The active metabolite is also found in breast milk.
Benzoyl Peroxide Pregnancy And Lactation Text: This medication is Pregnancy Category C. It is unknown if benzoyl peroxide is excreted in breast milk.
Minocycline Counseling: Patient advised regarding possible photosensitivity and discoloration of the teeth, skin, lips, tongue and gums.  Patient instructed to avoid sunlight, if possible.  When exposed to sunlight, patients should wear protective clothing, sunglasses, and sunscreen.  The patient was instructed to call the office immediately if the following severe adverse effects occur:  hearing changes, easy bruising/bleeding, severe headache, or vision changes.  The patient verbalized understanding of the proper use and possible adverse effects of minocycline.  All of the patient's questions and concerns were addressed.
Topical Clindamycin Counseling: Patient counseled that this medication may cause skin irritation or allergic reactions.  In the event of skin irritation, the patient was advised to reduce the amount of the drug applied or use it less frequently.   The patient verbalized understanding of the proper use and possible adverse effects of clindamycin.  All of the patient's questions and concerns were addressed.
Topical Sulfur Applications Counseling: Topical Sulfur Counseling: Patient counseled that this medication may cause skin irritation or allergic reactions.  In the event of skin irritation, the patient was advised to reduce the amount of the drug applied or use it less frequently.   The patient verbalized understanding of the proper use and possible adverse effects of topical sulfur application.  All of the patient's questions and concerns were addressed.
Sarecycline Counseling: Patient advised regarding possible photosensitivity and discoloration of the teeth, skin, lips, tongue and gums.  Patient instructed to avoid sunlight, if possible.  When exposed to sunlight, patients should wear protective clothing, sunglasses, and sunscreen.  The patient was instructed to call the office immediately if the following severe adverse effects occur:  hearing changes, easy bruising/bleeding, severe headache, or vision changes.  The patient verbalized understanding of the proper use and possible adverse effects of sarecycline.  All of the patient's questions and concerns were addressed.
Birth Control Pills Counseling: Birth Control Pill Counseling: I discussed with the patient the potential side effects of OCPs including but not limited to increased risk of stroke, heart attack, thrombophlebitis, deep venous thrombosis, hepatic adenomas, breast changes, GI upset, headaches, and depression.  The patient verbalized understanding of the proper use and possible adverse effects of OCPs. All of the patient's questions and concerns were addressed.
Topical Retinoid Pregnancy And Lactation Text: This medication is Pregnancy Category C. It is unknown if this medication is excreted in breast milk.
Winlevi Counseling:  I discussed with the patient the risks of topical clascoterone including but not limited to erythema, scaling, itching, and stinging. Patient voiced their understanding.
Isotretinoin Pregnancy And Lactation Text: This medication is Pregnancy Category X and is considered extremely dangerous during pregnancy. It is unknown if it is excreted in breast milk.
Topical Clindamycin Pregnancy And Lactation Text: This medication is Pregnancy Category B and is considered safe during pregnancy. It is unknown if it is excreted in breast milk.
Doxycycline Counseling:  Patient counseled regarding possible photosensitivity and increased risk for sunburn.  Patient instructed to avoid sunlight, if possible.  When exposed to sunlight, patients should wear protective clothing, sunglasses, and sunscreen.  The patient was instructed to call the office immediately if the following severe adverse effects occur:  hearing changes, easy bruising/bleeding, severe headache, or vision changes.  The patient verbalized understanding of the proper use and possible adverse effects of doxycycline.  All of the patient's questions and concerns were addressed.
Tetracycline Counseling: Patient counseled regarding possible photosensitivity and increased risk for sunburn.  Patient instructed to avoid sunlight, if possible.  When exposed to sunlight, patients should wear protective clothing, sunglasses, and sunscreen.  The patient was instructed to call the office immediately if the following severe adverse effects occur:  hearing changes, easy bruising/bleeding, severe headache, or vision changes.  The patient verbalized understanding of the proper use and possible adverse effects of tetracycline.  All of the patient's questions and concerns were addressed. Patient understands to avoid pregnancy while on therapy due to potential birth defects.

## 2022-06-20 ENCOUNTER — APPOINTMENT (OUTPATIENT)
Dept: FAMILY MEDICINE | Facility: CLINIC | Age: 27
End: 2022-06-20

## 2022-06-20 ENCOUNTER — APPOINTMENT (OUTPATIENT)
Dept: CARDIOLOGY | Facility: CLINIC | Age: 27
End: 2022-06-20
Payer: COMMERCIAL

## 2022-06-20 VITALS
HEART RATE: 104 BPM | WEIGHT: 260 LBS | OXYGEN SATURATION: 100 % | SYSTOLIC BLOOD PRESSURE: 102 MMHG | HEIGHT: 72 IN | DIASTOLIC BLOOD PRESSURE: 74 MMHG | BODY MASS INDEX: 35.21 KG/M2

## 2022-06-20 VITALS
HEART RATE: 115 BPM | TEMPERATURE: 98.2 F | SYSTOLIC BLOOD PRESSURE: 110 MMHG | BODY MASS INDEX: 36.44 KG/M2 | DIASTOLIC BLOOD PRESSURE: 70 MMHG | OXYGEN SATURATION: 99 % | HEIGHT: 72 IN | WEIGHT: 269 LBS

## 2022-06-20 DIAGNOSIS — Z01.818 ENCOUNTER FOR OTHER PREPROCEDURAL EXAMINATION: ICD-10-CM

## 2022-06-20 DIAGNOSIS — R00.0 TACHYCARDIA, UNSPECIFIED: ICD-10-CM

## 2022-06-20 DIAGNOSIS — K83.1 OBSTRUCTION OF BILE DUCT: ICD-10-CM

## 2022-06-20 DIAGNOSIS — Z01.810 ENCOUNTER FOR PREPROCEDURAL CARDIOVASCULAR EXAMINATION: ICD-10-CM

## 2022-06-20 PROCEDURE — 99214 OFFICE O/P EST MOD 30 MIN: CPT

## 2022-06-20 NOTE — DISCUSSION/SUMMARY
[FreeTextEntry1] : Pt is a 27 y/o F who presents today for f/u.  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  She is feeling well overall and has no cardiac complaints - denies CP, SOB, diaphoresis, palpitations, dizziness, syncope, LE edema, PND, orthopnea. She is able to walk 2-3 miles \par \par montes 05/2021 NSR avg HR 97, <1% PVCs\par TTE 07/2021 EF 66%, no significant valvular disease\par \par At this time the pt has no signs or symptoms of active ischemia, heart failure, life-threatening arrhythmias, severe valvular pathology.\par VSS\par There are no cardiac contraindications for planned procedure. \par \par The described plan was discussed with the pt and mother.  All questions and concerns were addressed to the best of my knowledge.

## 2022-06-20 NOTE — HISTORY OF PRESENT ILLNESS
[FreeTextEntry1] : Pt is a 25 y/o F who presents today for f/u.  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  She is feeling well overall and has no cardiac complaints - denies CP, SOB, diaphoresis, palpitations, dizziness, syncope, LE edema, PND, orthopnea. She is able to walk 2-3 miles \par COVID+ 12/2020, not hospitalized\par \par montes 05/2021 NSR avg HR 97, <1% PVCs\par TTE 07/2021 EF 66%, no significant valvular disease\par \par PMH: none\par Family hx: grandfather MI/CABG/PCI 48\par Smoking status: never\par social ETOH\par no drug use\par Current exercise: 5x/week 2-3 hrs biking/walking\par Daily water intake: 1 gallon\par Daily caffeine intake: 1 cup coffee\par OTC medications: advil PRN, MVI\par NKDA\par Previous hospitalizations: none\par

## 2022-06-21 LAB — SARS-COV-2 N GENE NPH QL NAA+PROBE: NOT DETECTED

## 2022-06-22 RX ORDER — ONDANSETRON 8 MG/1
4 TABLET, FILM COATED ORAL ONCE
Refills: 0 | Status: DISCONTINUED | OUTPATIENT
Start: 2022-06-23 | End: 2022-06-23

## 2022-06-22 RX ORDER — OXYCODONE HYDROCHLORIDE 5 MG/1
5 TABLET ORAL ONCE
Refills: 0 | Status: DISCONTINUED | OUTPATIENT
Start: 2022-06-23 | End: 2022-06-23

## 2022-06-22 RX ORDER — SPIRONOLACTONE 25 MG/1
1 TABLET, FILM COATED ORAL
Qty: 0 | Refills: 0 | DISCHARGE

## 2022-06-22 RX ORDER — SODIUM CHLORIDE 9 MG/ML
1000 INJECTION, SOLUTION INTRAVENOUS
Refills: 0 | Status: DISCONTINUED | OUTPATIENT
Start: 2022-06-23 | End: 2022-06-23

## 2022-06-22 RX ORDER — FENTANYL CITRATE 50 UG/ML
50 INJECTION INTRAVENOUS
Refills: 0 | Status: DISCONTINUED | OUTPATIENT
Start: 2022-06-23 | End: 2022-06-23

## 2022-06-22 RX ORDER — LIRAGLUTIDE 6 MG/ML
0 INJECTION SUBCUTANEOUS
Qty: 0 | Refills: 0 | DISCHARGE

## 2022-06-22 NOTE — ASU PATIENT PROFILE, ADULT - FALL HARM RISK - UNIVERSAL INTERVENTIONS
Bed in lowest position, wheels locked, appropriate side rails in place/Call bell, personal items and telephone in reach/Instruct patient to call for assistance before getting out of bed or chair/Non-slip footwear when patient is out of bed/[place] to call system/Physically safe environment - no spills, clutter or unnecessary equipment/Purposeful Proactive Rounding/Room/bathroom lighting operational, light cord in reach

## 2022-06-22 NOTE — ASU PATIENT PROFILE, ADULT - NSICDXPASTMEDICALHX_GEN_ALL_CORE_FT
PAST MEDICAL HISTORY:  2019 novel coronavirus disease (COVID-19) Dec 2020 pt was not hospitalized.    ADHD     Elevated liver enzymes Feb 2022    H/O abdominal pain     H/O acute cholecystitis Feb 2022    H/O sinusitis     History of mononucleosis Feb 2022    History of PCOS     Lumbar herniated disc     Obesity     Ovarian cyst     PCOS (polycystic ovarian syndrome)

## 2022-06-23 ENCOUNTER — APPOINTMENT (OUTPATIENT)
Dept: GASTROENTEROLOGY | Facility: HOSPITAL | Age: 27
End: 2022-06-23

## 2022-06-23 ENCOUNTER — TRANSCRIPTION ENCOUNTER (OUTPATIENT)
Age: 27
End: 2022-06-23

## 2022-06-23 ENCOUNTER — OUTPATIENT (OUTPATIENT)
Dept: INPATIENT UNIT | Facility: HOSPITAL | Age: 27
LOS: 1 days | Discharge: HOME | End: 2022-06-23
Payer: COMMERCIAL

## 2022-06-23 VITALS
HEART RATE: 78 BPM | WEIGHT: 268.96 LBS | HEIGHT: 72 IN | RESPIRATION RATE: 16 BRPM | TEMPERATURE: 98 F | SYSTOLIC BLOOD PRESSURE: 116 MMHG | DIASTOLIC BLOOD PRESSURE: 74 MMHG | OXYGEN SATURATION: 100 %

## 2022-06-23 VITALS
OXYGEN SATURATION: 100 % | TEMPERATURE: 97 F | SYSTOLIC BLOOD PRESSURE: 125 MMHG | RESPIRATION RATE: 18 BRPM | HEART RATE: 62 BPM | DIASTOLIC BLOOD PRESSURE: 80 MMHG

## 2022-06-23 DIAGNOSIS — Z98.890 OTHER SPECIFIED POSTPROCEDURAL STATES: Chronic | ICD-10-CM

## 2022-06-23 DIAGNOSIS — Z96.89 PRESENCE OF OTHER SPECIFIED FUNCTIONAL IMPLANTS: ICD-10-CM

## 2022-06-23 LAB — HCG SERPL-ACNC: <1 MIU/ML — SIGNIFICANT CHANGE UP

## 2022-06-23 PROCEDURE — 36415 COLL VENOUS BLD VENIPUNCTURE: CPT

## 2022-06-23 PROCEDURE — 84702 CHORIONIC GONADOTROPIN TEST: CPT

## 2022-06-23 PROCEDURE — 43264 ERCP REMOVE DUCT CALCULI: CPT | Mod: GC

## 2022-06-23 PROCEDURE — C1769: CPT

## 2022-06-23 PROCEDURE — 43275 ERCP REMOVE FORGN BODY DUCT: CPT | Mod: GC

## 2022-06-23 PROCEDURE — 76000 FLUOROSCOPY <1 HR PHYS/QHP: CPT

## 2022-06-23 RX ORDER — PHENTERMINE HCL 30 MG
1 CAPSULE ORAL
Qty: 0 | Refills: 0 | DISCHARGE

## 2022-06-23 RX ORDER — LIRAGLUTIDE 6 MG/ML
1.8 INJECTION SUBCUTANEOUS
Qty: 0 | Refills: 0 | DISCHARGE

## 2022-06-23 RX ORDER — SPIRONOLACTONE 25 MG/1
1 TABLET, FILM COATED ORAL
Qty: 0 | Refills: 0 | DISCHARGE

## 2022-06-23 RX ORDER — TOPIRAMATE 25 MG
1 TABLET ORAL
Qty: 0 | Refills: 0 | DISCHARGE

## 2022-06-23 RX ADMIN — SODIUM CHLORIDE 100 MILLILITER(S): 9 INJECTION, SOLUTION INTRAVENOUS at 07:59

## 2022-06-23 NOTE — ASU PREOP CHECKLIST - BP NONINVASIVE DIASTOLIC (MM HG)
"Anesthesia Post Evaluation    Patient: Madan Bell    Procedure(s) Performed: Procedure(s) (LRB):  CARDIOVERSION (N/A)  ECHOCARDIOGRAM,TRANSESOPHAGEAL (N/A)    Final Anesthesia Type: general  Patient location during evaluation: PACU  Patient participation: Yes- Able to Participate  Level of consciousness: awake and alert  Post-procedure vital signs: reviewed and stable  Pain management: adequate  Airway patency: patent  PONV status at discharge: No PONV  Anesthetic complications: no      Cardiovascular status: blood pressure returned to baseline  Respiratory status: unassisted  Hydration status: euvolemic  Follow-up not needed.        Visit Vitals  BP (!) 121/58 (BP Location: Left arm, Patient Position: Lying)   Pulse (!) 50   Temp 36.5 °C (97.7 °F) (Temporal)   Resp 18   Ht 5' 8.5" (1.74 m)   Wt 68.9 kg (152 lb)   LMP  (LMP Unknown)   SpO2 100%   Breastfeeding? No   BMI 22.78 kg/m²       Pain/Zayra Score: Pain Assessment Performed: Yes (8/13/2018 12:08 PM)  Presence of Pain: denies (8/13/2018 12:08 PM)  Zayra Score: 10 (8/13/2018 12:08 PM)        "
74

## 2022-06-23 NOTE — ASU DISCHARGE PLAN (ADULT/PEDIATRIC) - NS MD DC FALL RISK RISK
For information on Fall & Injury Prevention, visit: [email or web address] OR  [email or web address] OR  https://www.cdc.gov/steadi/patient.html

## 2022-06-27 RX ORDER — ELECTROLYTES/DEXTROSE
32G X 4 MM SOLUTION, ORAL ORAL
Qty: 1 | Refills: 3 | Status: ACTIVE | COMMUNITY
Start: 2021-07-13 | End: 1900-01-01

## 2022-06-29 DIAGNOSIS — K83.8 OTHER SPECIFIED DISEASES OF BILIARY TRACT: ICD-10-CM

## 2022-06-29 DIAGNOSIS — F90.9 ATTENTION-DEFICIT HYPERACTIVITY DISORDER, UNSPECIFIED TYPE: ICD-10-CM

## 2022-06-29 DIAGNOSIS — Z86.16 PERSONAL HISTORY OF COVID-19: ICD-10-CM

## 2022-06-29 DIAGNOSIS — Z46.59 ENCOUNTER FOR FITTING AND ADJUSTMENT OF OTHER GASTROINTESTINAL APPLIANCE AND DEVICE: ICD-10-CM

## 2022-06-29 DIAGNOSIS — E66.9 OBESITY, UNSPECIFIED: ICD-10-CM

## 2022-07-06 PROBLEM — Z87.42 PERSONAL HISTORY OF OTHER DISEASES OF THE FEMALE GENITAL TRACT: Chronic | Status: ACTIVE | Noted: 2022-06-22

## 2022-07-06 PROBLEM — Z86.19 PERSONAL HISTORY OF OTHER INFECTIOUS AND PARASITIC DISEASES: Chronic | Status: ACTIVE | Noted: 2022-06-16

## 2022-07-06 PROBLEM — Z87.898 PERSONAL HISTORY OF OTHER SPECIFIED CONDITIONS: Chronic | Status: ACTIVE | Noted: 2022-06-22

## 2022-07-06 PROBLEM — M51.26 OTHER INTERVERTEBRAL DISC DISPLACEMENT, LUMBAR REGION: Chronic | Status: ACTIVE | Noted: 2022-06-22

## 2022-07-06 PROBLEM — E66.9 OBESITY, UNSPECIFIED: Chronic | Status: ACTIVE | Noted: 2022-06-16

## 2022-07-06 PROBLEM — R74.8 ABNORMAL LEVELS OF OTHER SERUM ENZYMES: Chronic | Status: ACTIVE | Noted: 2022-06-16

## 2022-07-06 PROBLEM — Z87.09 PERSONAL HISTORY OF OTHER DISEASES OF THE RESPIRATORY SYSTEM: Chronic | Status: ACTIVE | Noted: 2022-06-22

## 2022-07-06 PROBLEM — Z01.818 PREOP EXAMINATION: Status: ACTIVE | Noted: 2021-11-09

## 2022-07-06 PROBLEM — K83.1 BILIARY OBSTRUCTION: Status: ACTIVE | Noted: 2022-07-06

## 2022-07-06 PROBLEM — F90.9 ATTENTION-DEFICIT HYPERACTIVITY DISORDER, UNSPECIFIED TYPE: Chronic | Status: ACTIVE | Noted: 2022-06-22

## 2022-07-06 PROBLEM — U07.1 COVID-19: Chronic | Status: ACTIVE | Noted: 2022-06-16

## 2022-07-06 PROBLEM — Z87.19 PERSONAL HISTORY OF OTHER DISEASES OF THE DIGESTIVE SYSTEM: Chronic | Status: ACTIVE | Noted: 2022-06-16

## 2022-07-06 NOTE — ASSESSMENT
[Patient Optimized for Surgery] : Patient optimized for surgery [Modify anticoagulant treatment prior to procedure] : Modify anticoagulant treatment prior to procedure [Modify anti-platelet treatment prior to procedure] : Modify anti-platelet treatment prior to procedure [Continue medications as is] : Continue current medications [As per surgery] : as per surgery [No Further Testing Recommended] : no further testing recommended [FreeTextEntry4] : # Pre-op Clearance\par - Patient optimized for procedure\par - Expectations reviewed with pt, all questions addressed\par

## 2022-07-06 NOTE — HISTORY OF PRESENT ILLNESS
[No Pertinent Cardiac History] : no history of aortic stenosis, atrial fibrillation, coronary artery disease, recent myocardial infarction, or implantable device/pacemaker [No Pertinent Pulmonary History] : no history of asthma, COPD, sleep apnea, or smoking [No Adverse Anesthesia Reaction] : no adverse anesthesia reaction in self or family member [(Patient denies any chest pain, claudication, dyspnea on exertion, orthopnea, palpitations or syncope)] : Patient denies any chest pain, claudication, dyspnea on exertion, orthopnea, palpitations or syncope [Chronic Anticoagulation] : no chronic anticoagulation [Chronic Kidney Disease] : no chronic kidney disease [Diabetes] : no diabetes [FreeTextEntry1] : stent removal  [FreeTextEntry2] : 6/23/2022 [FreeTextEntry3] : Dr. Kaur [FreeTextEntry4] : Eufemia is a 26 year female here today for ERCP with stent removal with Dr. Kaur at [facility] on 06/23/2022. Patient denies any acute complaints today. Pt stated she had seen pulm Dr. Castillo for lung nodule management. Pt will be f/u in August 2022 for a chest CT. Pt stated she had seen cardio Dr. Vargas today for clearance. Pt confirmed she takes Phentermine HCl 30 MG, Victoza 18 MG/3 ML, and Topiramate 100 MG compliantly. Pt reported she has been on these medications since September 2021. She reported she had been tested for COVID-19 this morning. \par

## 2022-07-06 NOTE — END OF VISIT
[FreeTextEntry3] : Medical record entries made by the scribe today today, were at my direction and personally dictated to them by me, Dr. Nya Bone on June 20, 2022. I have reviewed the chart and agree that the record accurately reflects my personal performance of the history, physical exam, assessment, and plan.\par

## 2022-07-07 ENCOUNTER — NON-APPOINTMENT (OUTPATIENT)
Age: 27
End: 2022-07-07

## 2022-07-19 ENCOUNTER — NON-APPOINTMENT (OUTPATIENT)
Age: 27
End: 2022-07-19

## 2022-07-26 ENCOUNTER — APPOINTMENT (OUTPATIENT)
Dept: FAMILY MEDICINE | Facility: CLINIC | Age: 27
End: 2022-07-26

## 2022-07-26 ENCOUNTER — NON-APPOINTMENT (OUTPATIENT)
Age: 27
End: 2022-07-26

## 2022-07-26 ENCOUNTER — APPOINTMENT (OUTPATIENT)
Dept: CARDIOLOGY | Facility: CLINIC | Age: 27
End: 2022-07-26

## 2022-07-26 VITALS
OXYGEN SATURATION: 98 % | HEIGHT: 72 IN | SYSTOLIC BLOOD PRESSURE: 110 MMHG | TEMPERATURE: 98.2 F | WEIGHT: 272 LBS | BODY MASS INDEX: 36.84 KG/M2 | HEART RATE: 120 BPM | DIASTOLIC BLOOD PRESSURE: 70 MMHG

## 2022-07-26 VITALS
HEART RATE: 101 BPM | HEIGHT: 72 IN | SYSTOLIC BLOOD PRESSURE: 106 MMHG | DIASTOLIC BLOOD PRESSURE: 70 MMHG | OXYGEN SATURATION: 99 % | BODY MASS INDEX: 35.21 KG/M2 | WEIGHT: 260 LBS

## 2022-07-26 DIAGNOSIS — B27.99 INFECTIOUS MONONUCLEOSIS, UNSPECIFIED WITH OTHER COMPLICATION: ICD-10-CM

## 2022-07-26 DIAGNOSIS — R00.2 PALPITATIONS: ICD-10-CM

## 2022-07-26 DIAGNOSIS — B17.8 INFECTIOUS MONONUCLEOSIS, UNSPECIFIED WITH OTHER COMPLICATION: ICD-10-CM

## 2022-07-26 PROCEDURE — 93000 ELECTROCARDIOGRAM COMPLETE: CPT

## 2022-07-26 PROCEDURE — G0444 DEPRESSION SCREEN ANNUAL: CPT | Mod: 59

## 2022-07-26 PROCEDURE — 99395 PREV VISIT EST AGE 18-39: CPT | Mod: 25

## 2022-07-26 PROCEDURE — 99213 OFFICE O/P EST LOW 20 MIN: CPT | Mod: 25

## 2022-07-26 NOTE — DISCUSSION/SUMMARY
[FreeTextEntry1] : Pt is a 27 y/o F who presents today for f/u.  Pt reports feeling well and has no active cardiac complaints - denies CP, SOB, palpitations, dizziness, syncope, edema, orthopnea, PND, orthopnea.  No exertional symptoms. \par \par montes 05/2021 NSR avg HR 97, <1% PVCs\par TTE 07/2021 EF 66%, no significant valvular disease\par \par Pt will return in 12 mnths or sooner as needed but I encouraged communication via phone or portal if necessary. \par The described plan was discussed with the pt and mother.  All questions and concerns were addressed to the best of my knowledge.  [EKG obtained to assist in diagnosis and management of assessed problem(s)] : EKG obtained to assist in diagnosis and management of assessed problem(s)

## 2022-07-26 NOTE — HEALTH RISK ASSESSMENT
[Patient reported PAP Smear was normal] : Patient reported PAP Smear was normal [Good] : ~his/her~  mood as  good [Never] : Never [Yes] : Yes [2 - 4 times a month (2 pts)] : 2-4 times a month (2 points) [1 or 2 (0 pts)] : 1 or 2 (0 points) [Never (0 pts)] : Never (0 points) [0] : 2) Feeling down, depressed, or hopeless: Not at all (0) [PHQ-2 Negative - No further assessment needed] : PHQ-2 Negative - No further assessment needed [I have developed a follow-up plan documented below in the note.] : I have developed a follow-up plan documented below in the note. [None] : None [Alone] : lives alone [Employed] : employed [Single] : single [Sexually Active] : sexually active [Feels Safe at Home] : Feels safe at home [Fully functional (bathing, dressing, toileting, transferring, walking, feeding)] : Fully functional (bathing, dressing, toileting, transferring, walking, feeding) [Fully functional (using the telephone, shopping, preparing meals, housekeeping, doing laundry, using] : Fully functional and needs no help or supervision to perform IADLs (using the telephone, shopping, preparing meals, housekeeping, doing laundry, using transportation, managing medications and managing finances) [Audit-CScore] : 2 [de-identified] : 5-6 days a week walking and biking  [de-identified] : 80/20  [CAT7Vewns] : 0 [PapSmearDate] : 09/21

## 2022-07-26 NOTE — PHYSICAL EXAM
[No Varicosities] : no varicosities [Pedal Pulses Present] : the pedal pulses are present [No Edema] : there was no peripheral edema [Normal] : affect was normal and insight and judgment were intact

## 2022-07-26 NOTE — HISTORY OF PRESENT ILLNESS
[FreeTextEntry1] : Pt is a 25 y/o F who presents today for f/u.  Pt reports feeling well and has no active cardiac complaints - denies CP, SOB, palpitations, dizziness, syncope, edema, orthopnea, PND, orthopnea.  No exertional symptoms. \par COVID+ 12/2020, not hospitalized\par \par montes 05/2021 NSR avg HR 97, <1% PVCs\par TTE 07/2021 EF 66%, no significant valvular disease\par \par PMH: BMI 34\par Family hx: grandfather MI/CABG/PCI 48\par Smoking status: never\par social ETOH\par no drug use\par Current exercise: 5x/week 2-3 hrs biking/walking\par Daily water intake: 1 gallon\par Daily caffeine intake: 1 cup coffee\par OTC medications: advil PRN, MVI\par NKDA\par Previous hospitalizations: none\par

## 2022-07-26 NOTE — PLAN
[FreeTextEntry1] : BW drawn today\par encourage follow up with weight loss MD\par Pap smear up to date\par continue diet/exercise \par follow up 1 year or sooner if needed

## 2022-07-26 NOTE — HISTORY OF PRESENT ILLNESS
[FreeTextEntry1] : CPE [de-identified] : 27yo F presents for NPA. Pt has no acute complaints today. Pt currently taking phentermine 30mg, topiramate 50mg, tri-lo-sprintec, and victoza. Pt reports she had stent removed after mono-hepatitis on 6/23/22 and is doing well. Pt had incidental findings while in hospital of lung nodule and ovarian cyst. Pt is having repeat CT scan on 8/15 and is having repeat ultrasound to monitor ovarian cyst. Pt reports seeing Dr. Vargas for palpitations and had a holter monitor that was normal. \par Pt has been dieting and exercising consistently and has lost 30lbs over the past year with the help of medications. Pt reports she feels like she has plateaued and is discussing with weight loss MD about switching medication to new injectable. Pt reports her mood is good, lives in [facility] [place] [address] by herself. Pt reports she sleeps well and has normal bowel movements. Pt complaining today of continued back/neck pain from pendulous breast ( 44 DDD). \par Pt denies any CP, palpitations, SOB, BATEMAN, fevers, chills, abdominal pain, N/V, diarrhea, constipation, BRBPR or dark tarry stools.\par

## 2022-07-28 LAB
25(OH)D3 SERPL-MCNC: 44.7 NG/ML
ALBUMIN SERPL ELPH-MCNC: 4.4 G/DL
ALP BLD-CCNC: 76 U/L
ALT SERPL-CCNC: 14 U/L
ANION GAP SERPL CALC-SCNC: 11 MMOL/L
APPEARANCE: CLEAR
AST SERPL-CCNC: 17 U/L
BACTERIA: NEGATIVE
BASOPHILS # BLD AUTO: 0.03 K/UL
BASOPHILS NFR BLD AUTO: 0.5 %
BILIRUB SERPL-MCNC: 0.3 MG/DL
BILIRUBIN URINE: NEGATIVE
BLOOD URINE: NEGATIVE
BUN SERPL-MCNC: 8 MG/DL
C TRACH RRNA SPEC QL NAA+PROBE: NOT DETECTED
CALCIUM SERPL-MCNC: 9.6 MG/DL
CHLORIDE SERPL-SCNC: 106 MMOL/L
CHOLEST SERPL-MCNC: 195 MG/DL
CO2 SERPL-SCNC: 21 MMOL/L
COLOR: YELLOW
CREAT SERPL-MCNC: 0.85 MG/DL
DHEA-S SERPL-MCNC: 165 UG/DL
EGFR: 96 ML/MIN/1.73M2
EOSINOPHIL # BLD AUTO: 0.11 K/UL
EOSINOPHIL NFR BLD AUTO: 2 %
ESTIMATED AVERAGE GLUCOSE: 91 MG/DL
GLUCOSE QUALITATIVE U: NEGATIVE
GLUCOSE SERPL-MCNC: 84 MG/DL
HBA1C MFR BLD HPLC: 4.8 %
HCT VFR BLD CALC: 40.3 %
HDLC SERPL-MCNC: 65 MG/DL
HGB BLD-MCNC: 12.9 G/DL
HYALINE CASTS: 1 /LPF
IMM GRANULOCYTES NFR BLD AUTO: 0.4 %
INSULIN SERPL-MCNC: 19.7 UU/ML
KETONES URINE: NEGATIVE
LDLC SERPL CALC-MCNC: 103 MG/DL
LEUKOCYTE ESTERASE URINE: NEGATIVE
LYMPHOCYTES # BLD AUTO: 1.66 K/UL
LYMPHOCYTES NFR BLD AUTO: 29.5 %
MAN DIFF?: NORMAL
MCHC RBC-ENTMCNC: 27.6 PG
MCHC RBC-ENTMCNC: 32 GM/DL
MCV RBC AUTO: 86.3 FL
MICROSCOPIC-UA: NORMAL
MONOCYTES # BLD AUTO: 0.33 K/UL
MONOCYTES NFR BLD AUTO: 5.9 %
N GONORRHOEA RRNA SPEC QL NAA+PROBE: NOT DETECTED
NEUTROPHILS # BLD AUTO: 3.48 K/UL
NEUTROPHILS NFR BLD AUTO: 61.7 %
NITRITE URINE: NEGATIVE
NONHDLC SERPL-MCNC: 130 MG/DL
PH URINE: 7
PLATELET # BLD AUTO: 293 K/UL
POTASSIUM SERPL-SCNC: 4.6 MMOL/L
PROT SERPL-MCNC: 7.2 G/DL
PROTEIN URINE: NEGATIVE
RBC # BLD: 4.67 M/UL
RBC # FLD: 13.9 %
RED BLOOD CELLS URINE: 5 /HPF
SODIUM SERPL-SCNC: 138 MMOL/L
SOURCE AMPLIFICATION: NORMAL
SOURCE AMPLIFICATION: NORMAL
SPECIFIC GRAVITY URINE: 1.02
SQUAMOUS EPITHELIAL CELLS: 4 /HPF
T VAGINALIS RRNA SPEC QL NAA+PROBE: NOT DETECTED
TRIGL SERPL-MCNC: 135 MG/DL
TSH SERPL-ACNC: 1.29 UIU/ML
URATE SERPL-MCNC: 4.5 MG/DL
UROBILINOGEN URINE: NORMAL
WBC # FLD AUTO: 5.63 K/UL
WHITE BLOOD CELLS URINE: 1 /HPF

## 2022-07-29 LAB
TESTOST FREE SERPL-MCNC: 1.6 PG/ML
TESTOST SERPL-MCNC: 32.1 NG/DL

## 2022-08-01 LAB — 17OHP SERPL-MCNC: 16 NG/DL

## 2022-08-04 ENCOUNTER — OUTPATIENT (OUTPATIENT)
Dept: OUTPATIENT SERVICES | Facility: HOSPITAL | Age: 27
LOS: 1 days | End: 2022-08-04
Payer: COMMERCIAL

## 2022-08-04 ENCOUNTER — APPOINTMENT (OUTPATIENT)
Dept: CT IMAGING | Facility: CLINIC | Age: 27
End: 2022-08-04

## 2022-08-04 DIAGNOSIS — Z98.890 OTHER SPECIFIED POSTPROCEDURAL STATES: Chronic | ICD-10-CM

## 2022-08-04 DIAGNOSIS — R91.1 SOLITARY PULMONARY NODULE: ICD-10-CM

## 2022-08-04 PROCEDURE — 71250 CT THORAX DX C-: CPT

## 2022-08-04 PROCEDURE — 71250 CT THORAX DX C-: CPT | Mod: 26

## 2022-08-09 ENCOUNTER — NON-APPOINTMENT (OUTPATIENT)
Age: 27
End: 2022-08-09

## 2022-08-10 ENCOUNTER — TRANSCRIPTION ENCOUNTER (OUTPATIENT)
Age: 27
End: 2022-08-10

## 2022-08-12 ENCOUNTER — APPOINTMENT (OUTPATIENT)
Dept: OBGYN | Facility: CLINIC | Age: 27
End: 2022-08-12

## 2022-08-12 VITALS
HEIGHT: 72 IN | SYSTOLIC BLOOD PRESSURE: 120 MMHG | WEIGHT: 265 LBS | DIASTOLIC BLOOD PRESSURE: 74 MMHG | BODY MASS INDEX: 35.89 KG/M2

## 2022-08-12 LAB
HCG UR QL: NEGATIVE
QUALITY CONTROL: YES

## 2022-08-12 PROCEDURE — 99214 OFFICE O/P EST MOD 30 MIN: CPT | Mod: 25

## 2022-08-12 PROCEDURE — 81025 URINE PREGNANCY TEST: CPT

## 2022-08-12 RX ORDER — LIRAGLUTIDE 6 MG/ML
18 INJECTION SUBCUTANEOUS
Qty: 27 | Refills: 5 | Status: DISCONTINUED | COMMUNITY
Start: 2021-07-07 | End: 2022-08-12

## 2022-08-12 RX ORDER — AZELAIC ACID 0.15 G/G
15 GEL TOPICAL
Qty: 50 | Refills: 0 | Status: DISCONTINUED | COMMUNITY
Start: 2022-04-18

## 2022-08-12 RX ORDER — DOXYCYCLINE HYCLATE 100 MG/1
100 CAPSULE ORAL
Qty: 60 | Refills: 0 | Status: DISCONTINUED | COMMUNITY
Start: 2022-04-18

## 2022-08-12 RX ORDER — FAMOTIDINE 20 MG/1
20 TABLET, FILM COATED ORAL
Qty: 60 | Refills: 0 | Status: DISCONTINUED | COMMUNITY
Start: 2022-02-12

## 2022-08-12 NOTE — PHYSICAL EXAM
[Chaperone Present] : A chaperone was present in the examining room during all aspects of the physical examination [Appropriately responsive] : appropriately responsive [Alert] : alert [No Acute Distress] : no acute distress [Soft] : soft [Non-tender] : non-tender [Non-distended] : non-distended [Oriented x3] : oriented x3 [Labia Majora] : normal [Labia Minora] : normal [Discharge] : a  ~M vaginal discharge was present [Moderate] : moderate [Brown] : brown [Thick] : thick [Normal] : normal [Uterine Adnexae] : non-palpable [FreeTextEntry1] : GERMAN  [Tenderness] : nontender

## 2022-08-12 NOTE — PLAN
[FreeTextEntry1] : -F/u vaginal cultures, r/o infection\par -Keep scheduled TVUS apt. on 8/26, evaluate structural causes of bleeding\par -We discussed multiple possible etiologies of BTB and if no infectious or structural causes, consider switching OCP \par -Continue OCP and track bleeding\par -Call or RTO PRN for any new problems, questions or concerns

## 2022-08-12 NOTE — HISTORY OF PRESENT ILLNESS
[Gonorrhea test offered] : Gonorrhea test offered [Chlamydia test offered] : Chlamydia test offered [Oral Contraceptive] : uses oral contraception pills [Y] : Patient is sexually active [N] : Patient denies prior pregnancies [Menarche Age: ____] : age at menarche was [unfilled] [Currently Active] : currently active [Men] : men [PapSmeardate] : 09/01/2021 [TextBox_31] : Negative [GonorrheaDate] : 07/26/2022 [TextBox_63] : Negative [ChlamydiaDate] : 07/26/2022 [TextBox_68] : Negative [LMPDate] : 07/14/2022 [FreeTextEntry1] : 07/14/2022

## 2022-08-15 LAB
C TRACH RRNA SPEC QL NAA+PROBE: NOT DETECTED
CANDIDA VAG CYTO: NOT DETECTED
G VAGINALIS+PREV SP MTYP VAG QL MICRO: NOT DETECTED
N GONORRHOEA RRNA SPEC QL NAA+PROBE: NOT DETECTED
SOURCE AMPLIFICATION: NORMAL
T VAGINALIS VAG QL WET PREP: NOT DETECTED

## 2022-08-25 ENCOUNTER — APPOINTMENT (OUTPATIENT)
Dept: PULMONOLOGY | Facility: CLINIC | Age: 27
End: 2022-08-25

## 2022-08-26 ENCOUNTER — APPOINTMENT (OUTPATIENT)
Dept: ANTEPARTUM | Facility: CLINIC | Age: 27
End: 2022-08-26

## 2022-08-26 ENCOUNTER — APPOINTMENT (OUTPATIENT)
Dept: PULMONOLOGY | Facility: CLINIC | Age: 27
End: 2022-08-26

## 2022-08-26 ENCOUNTER — ASOB RESULT (OUTPATIENT)
Age: 27
End: 2022-08-26

## 2022-08-26 ENCOUNTER — APPOINTMENT (OUTPATIENT)
Dept: OBGYN | Facility: CLINIC | Age: 27
End: 2022-08-26

## 2022-08-26 VITALS
WEIGHT: 255 LBS | BODY MASS INDEX: 33.64 KG/M2 | RESPIRATION RATE: 14 BRPM | SYSTOLIC BLOOD PRESSURE: 114 MMHG | DIASTOLIC BLOOD PRESSURE: 72 MMHG | OXYGEN SATURATION: 99 % | HEART RATE: 89 BPM

## 2022-08-26 VITALS
SYSTOLIC BLOOD PRESSURE: 120 MMHG | DIASTOLIC BLOOD PRESSURE: 72 MMHG | WEIGHT: 265 LBS | HEIGHT: 72 IN | BODY MASS INDEX: 35.89 KG/M2

## 2022-08-26 DIAGNOSIS — N83.201 UNSPECIFIED OVARIAN CYST, RIGHT SIDE: ICD-10-CM

## 2022-08-26 DIAGNOSIS — R91.1 SOLITARY PULMONARY NODULE: ICD-10-CM

## 2022-08-26 PROCEDURE — 99213 OFFICE O/P EST LOW 20 MIN: CPT

## 2022-08-26 PROCEDURE — 76830 TRANSVAGINAL US NON-OB: CPT

## 2022-08-26 NOTE — ASSESSMENT
[FreeTextEntry1] : 25y/o female never smoker:\par \par 1- CT  with intrafissural  lymph nodes likely \par 2- recurrent  sore throat with h/o  Momo bar virus  2/2022\par 3- vaccinations    covid +  ( covid 12/2020) \par 4- POC/ obesity  on  IM   Mounjaro  ( can cause thyroid carcinoma)\par 5- + FH cancer   \par \par Recommendations\par 1- cxr pa/lateral  in six months\par 2- immunoglobulin levels \par 3- to bring [facility]  ct to [facility]   for comparison \par \par \par return in six months or earlier if symptoms \par -mother and daughter agree

## 2022-08-26 NOTE — PLAN
[FreeTextEntry1] : sono reviewed- complex left cyst- decreased in size.  follow up 3 mos with MD and repeat US.  monitor btb- change pill if bleeding continues.\par

## 2022-08-26 NOTE — REVIEW OF SYSTEMS
[Sinus Problems] : sinus problems [Obesity] : obesity [Fever] : no fever [Recent Wt Gain (___ Lbs)] : ~T no recent weight gain [Chills] : no chills [Recent Wt Loss (___ Lbs)] : ~T no recent weight loss [Cough] : no cough [Hemoptysis] : no hemoptysis [Sputum] : no sputum [Dyspnea] : no dyspnea [Wheezing] : no wheezing [SOB on Exertion] : no sob on exertion [GERD] : no gerd [Abdominal Pain] : no abdominal pain [Nausea] : no nausea [Vomiting] : no vomiting [Dysphagia] : no dysphagia [Bleeding] : no bleeding [Rash] : no rash [Blood Transfusion] : no blood transfusion [Clotting Disorder/ Frequent bleeding] : no clotting disorder/ frequent bleeding [Depression] : no depression [Anxiety] : no anxiety [Panic Attacks] : no panic attacks [Diabetes] : no diabetes [Thyroid Problem] : no thyroid problem

## 2022-08-26 NOTE — REASON FOR VISIT
[Follow-Up] : a follow-up visit [Abnormal CXR/ Chest CT] : an abnormal CXR/ chest CT [Parent] : parent

## 2022-08-26 NOTE — PHYSICAL EXAM
[Normal Oropharynx] : normal oropharynx [III] : Mallampati Class: III [Supple] : supple [No Neck Mass] : no neck mass [No JVD] : no jvd [Normal S1, S2] : normal s1, s2 [No Murmurs] : no murmurs [Clear to Auscultation Bilaterally] : clear to auscultation bilaterally [Benign] : benign [No Masses] : no masses [Soft] : soft [Normal Bowel Sounds] : normal bowel sounds [Normal Gait] : normal gait [No Edema] : no edema [No Rash] : no rash [No Motor Deficits] : no motor deficits [Normal Affect] : normal affect [TextBox_2] : pleasant female no distress  no cough

## 2022-08-30 ENCOUNTER — NON-APPOINTMENT (OUTPATIENT)
Age: 27
End: 2022-08-30

## 2022-08-30 LAB
DEPRECATED KAPPA LC FREE/LAMBDA SER: 0.75 RATIO
IGA SER QL IEP: 227 MG/DL
IGG SER QL IEP: 1116 MG/DL
IGG SUBSET TOTAL IGG: 1150 MG/DL
IGG1 SER-MCNC: 629 MG/DL
IGG2 SER-MCNC: 346 MG/DL
IGG3 SER-MCNC: 40 MG/DL
IGG4 SER-MCNC: 11 MG/DL
IGM SER QL IEP: 157 MG/DL
KAPPA LC CSF-MCNC: 2.2 MG/DL
KAPPA LC SERPL-MCNC: 1.65 MG/DL

## 2022-09-02 ENCOUNTER — APPOINTMENT (OUTPATIENT)
Dept: PULMONOLOGY | Facility: CLINIC | Age: 27
End: 2022-09-02

## 2022-09-02 ENCOUNTER — APPOINTMENT (OUTPATIENT)
Dept: OBGYN | Facility: CLINIC | Age: 27
End: 2022-09-02

## 2022-10-13 ENCOUNTER — APPOINTMENT (OUTPATIENT)
Dept: OBGYN | Facility: CLINIC | Age: 27
End: 2022-10-13

## 2022-10-13 VITALS
HEIGHT: 72 IN | WEIGHT: 250 LBS | SYSTOLIC BLOOD PRESSURE: 112 MMHG | BODY MASS INDEX: 33.86 KG/M2 | DIASTOLIC BLOOD PRESSURE: 64 MMHG

## 2022-10-13 DIAGNOSIS — Z11.3 ENCOUNTER FOR SCREENING FOR INFECTIONS WITH A PREDOMINANTLY SEXUAL MODE OF TRANSMISSION: ICD-10-CM

## 2022-10-13 DIAGNOSIS — Z23 ENCOUNTER FOR IMMUNIZATION: ICD-10-CM

## 2022-10-13 DIAGNOSIS — Z86.19 PERSONAL HISTORY OF OTHER INFECTIOUS AND PARASITIC DISEASES: ICD-10-CM

## 2022-10-13 DIAGNOSIS — Z01.419 ENCOUNTER FOR GYNECOLOGICAL EXAMINATION (GENERAL) (ROUTINE) W/OUT ABNORMAL FINDINGS: ICD-10-CM

## 2022-10-13 PROCEDURE — 90686 IIV4 VACC NO PRSV 0.5 ML IM: CPT

## 2022-10-13 PROCEDURE — 99395 PREV VISIT EST AGE 18-39: CPT | Mod: 25

## 2022-10-13 PROCEDURE — 90471 IMMUNIZATION ADMIN: CPT

## 2022-10-13 NOTE — HISTORY OF PRESENT ILLNESS
[Oral Contraceptive] : uses oral contraception pills [Y] : Patient is sexually active [N] : Patient denies prior pregnancies [Ultrasound] : ultrasound [Menarche Age: ____] : age at menarche was [unfilled] [Currently Active] : currently active [Men] : men [PapSmeardate] : 09/01/2021 [TextBox_31] : Negative [LMPDate] : 10/05/2022 [TextBox_27] : Pelvic: 08/26/2022 [FreeTextEntry1] : 10/05/2022

## 2022-10-13 NOTE — PLAN
[FreeTextEntry1] : -F/u pap and STI screening\par -Flu vaccine administered today, pt. tolerates with no issues \par -Rx for OCP renewed \par -Advised multi vitamin or vitamin D supplement \par -Continue to see dermatology, routine skin checks\par -Call or RTO PRN for any new problems, questions or concerns\par \par \par

## 2022-10-13 NOTE — PHYSICAL EXAM
[Chaperone Present] : A chaperone was present in the examining room during all aspects of the physical examination [Appropriately responsive] : appropriately responsive [Alert] : alert [No Acute Distress] : no acute distress [Soft] : soft [Non-tender] : non-tender [Non-distended] : non-distended [No Mass] : no mass [Oriented x3] : oriented x3 [Examination Of The Breasts] : a normal appearance [No Masses] : no breast masses were palpable [Labia Majora] : normal [Labia Minora] : normal [No Bleeding] : There was no active vaginal bleeding [Normal] : normal [Uterine Adnexae] : non-palpable [Tenderness] : nontender [FreeTextEntry1] : Skin changes consistent with hx fof HS

## 2022-10-13 NOTE — COUNSELING
[Vitamins/Supplements] : vitamins/supplements [Bladder Hygiene] : bladder hygiene [Contraception/ Emergency Contraception/ Safe Sexual Practices] : contraception, emergency contraception, safe sexual practices [STD (testing, results, tx)] : STD (testing, results, tx) [Influenza Vaccine] : influenza vaccine

## 2022-10-14 LAB
C TRACH RRNA SPEC QL NAA+PROBE: NOT DETECTED
HAV IGM SER QL: NONREACTIVE
HBV CORE IGM SER QL: NONREACTIVE
HBV SURFACE AG SER QL: NONREACTIVE
HCV AB SER QL: NONREACTIVE
HCV S/CO RATIO: 0.1 S/CO
HIV1+2 AB SPEC QL IA.RAPID: NONREACTIVE
N GONORRHOEA RRNA SPEC QL NAA+PROBE: NOT DETECTED
SOURCE TP AMPLIFICATION: NORMAL
T PALLIDUM AB SER QL IA: NEGATIVE

## 2022-10-19 LAB — CYTOLOGY CVX/VAG DOC THIN PREP: ABNORMAL

## 2022-11-15 ENCOUNTER — NON-APPOINTMENT (OUTPATIENT)
Age: 27
End: 2022-11-15

## 2022-11-23 ENCOUNTER — APPOINTMENT (OUTPATIENT)
Dept: RADIOLOGY | Facility: CLINIC | Age: 27
End: 2022-11-23

## 2022-11-23 ENCOUNTER — OUTPATIENT (OUTPATIENT)
Dept: OUTPATIENT SERVICES | Facility: HOSPITAL | Age: 27
LOS: 1 days | End: 2022-11-23
Payer: COMMERCIAL

## 2022-11-23 DIAGNOSIS — Z98.890 OTHER SPECIFIED POSTPROCEDURAL STATES: Chronic | ICD-10-CM

## 2022-11-23 DIAGNOSIS — R91.1 SOLITARY PULMONARY NODULE: ICD-10-CM

## 2022-11-23 DIAGNOSIS — Z00.8 ENCOUNTER FOR OTHER GENERAL EXAMINATION: ICD-10-CM

## 2022-11-23 PROCEDURE — 71046 X-RAY EXAM CHEST 2 VIEWS: CPT | Mod: 26

## 2022-11-23 PROCEDURE — 71046 X-RAY EXAM CHEST 2 VIEWS: CPT

## 2022-11-28 ENCOUNTER — NON-APPOINTMENT (OUTPATIENT)
Age: 27
End: 2022-11-28

## 2022-11-29 ENCOUNTER — APPOINTMENT (OUTPATIENT)
Dept: FAMILY MEDICINE | Facility: CLINIC | Age: 27
End: 2022-11-29

## 2022-11-29 ENCOUNTER — APPOINTMENT (OUTPATIENT)
Dept: ANTEPARTUM | Facility: CLINIC | Age: 27
End: 2022-11-29

## 2022-11-29 ENCOUNTER — APPOINTMENT (OUTPATIENT)
Dept: PULMONOLOGY | Facility: CLINIC | Age: 27
End: 2022-11-29

## 2022-11-29 ENCOUNTER — ASOB RESULT (OUTPATIENT)
Age: 27
End: 2022-11-29

## 2022-11-29 ENCOUNTER — APPOINTMENT (OUTPATIENT)
Dept: OBGYN | Facility: CLINIC | Age: 27
End: 2022-11-29

## 2022-11-29 VITALS
DIASTOLIC BLOOD PRESSURE: 68 MMHG | SYSTOLIC BLOOD PRESSURE: 118 MMHG | HEIGHT: 72 IN | BODY MASS INDEX: 32.37 KG/M2 | WEIGHT: 239 LBS

## 2022-11-29 DIAGNOSIS — Z30.41 ENCOUNTER FOR SURVEILLANCE OF CONTRACEPTIVE PILLS: ICD-10-CM

## 2022-11-29 DIAGNOSIS — N83.299 OTHER OVARIAN CYST, UNSPECIFIED SIDE: ICD-10-CM

## 2022-11-29 DIAGNOSIS — N83.209 UNSPECIFIED OVARIAN CYST, UNSPECIFIED SIDE: ICD-10-CM

## 2022-11-29 PROCEDURE — 99214 OFFICE O/P EST MOD 30 MIN: CPT

## 2022-11-29 PROCEDURE — 76830 TRANSVAGINAL US NON-OB: CPT

## 2022-11-29 PROCEDURE — 36415 COLL VENOUS BLD VENIPUNCTURE: CPT

## 2022-12-03 ENCOUNTER — TRANSCRIPTION ENCOUNTER (OUTPATIENT)
Age: 27
End: 2022-12-03

## 2022-12-03 LAB
AFP-TM SERPL-MCNC: <1.8 NG/ML
CA 125 (LABCORP): 17.2 U/ML
CEA SERPL-MCNC: 1.1 NG/ML
HCG-TM SERPL-MCNC: <1 MIU/ML
HE4X: 54.8 PMOL/L
INHIBIN B: 14.2 PG/ML
LDH SERPL-CCNC: 191 U/L
POSTMENOPAUSAL ROMA: 1.37
PREMENOPAUSAL ROMA: 0.92
ROMA COMMENT: NORMAL

## 2022-12-06 NOTE — HISTORY OF PRESENT ILLNESS
[Gonorrhea test offered] : Gonorrhea test offered [Chlamydia test offered] : Chlamydia test offered [N] : Patient denies prior pregnancies [Menarche Age: ____] : age at menarche was [unfilled] [No] : Patient does not have concerns regarding sex [Oral Contraceptive] : uses oral contraception pills [Y] : Patient is sexually active [Currently Active] : currently active [PapSmeardate] : 10/13/22 [TextBox_31] : NEG [GonorrheaDate] : 10/13/22 [TextBox_63] : NEG [ChlamydiaDate] : 10/13/22 [TextBox_68] : NEG [LMPDate] : 11/1/22 [PGHxTotal] : 0 [FreeTextEntry1] : 11/1/22

## 2022-12-08 LAB — INHIBIN A SERPL-MCNC: 5.9 PG/ML

## 2022-12-15 ENCOUNTER — NON-APPOINTMENT (OUTPATIENT)
Age: 27
End: 2022-12-15

## 2022-12-22 ENCOUNTER — APPOINTMENT (OUTPATIENT)
Dept: FAMILY MEDICINE | Facility: CLINIC | Age: 27
End: 2022-12-22

## 2022-12-22 VITALS
HEART RATE: 75 BPM | HEIGHT: 72 IN | WEIGHT: 233 LBS | TEMPERATURE: 98.2 F | BODY MASS INDEX: 31.56 KG/M2 | OXYGEN SATURATION: 98 % | SYSTOLIC BLOOD PRESSURE: 124 MMHG | DIASTOLIC BLOOD PRESSURE: 86 MMHG

## 2022-12-22 DIAGNOSIS — E66.9 OBESITY, UNSPECIFIED: ICD-10-CM

## 2022-12-22 DIAGNOSIS — R63.4 ABNORMAL WEIGHT LOSS: ICD-10-CM

## 2022-12-22 PROCEDURE — 99213 OFFICE O/P EST LOW 20 MIN: CPT

## 2022-12-22 RX ORDER — PHENTERMINE HYDROCHLORIDE 30 MG/1
30 CAPSULE ORAL
Qty: 30 | Refills: 0 | Status: DISCONTINUED | COMMUNITY
Start: 2021-10-11 | End: 2022-12-22

## 2022-12-22 RX ORDER — TIRZEPATIDE 2.5 MG/.5ML
2.5 INJECTION, SOLUTION SUBCUTANEOUS
Qty: 2 | Refills: 0 | Status: DISCONTINUED | COMMUNITY
Start: 2022-08-03 | End: 2022-12-22

## 2022-12-22 RX ORDER — MUPIROCIN 20 MG/G
2 OINTMENT TOPICAL
Qty: 22 | Refills: 0 | Status: DISCONTINUED | COMMUNITY
Start: 2022-04-18 | End: 2022-12-22

## 2022-12-22 RX ORDER — NAFTIFINE HYDROCHLORIDE 2 G/100G
2 GEL TOPICAL
Qty: 60 | Refills: 0 | Status: DISCONTINUED | COMMUNITY
Start: 2022-02-28 | End: 2022-12-22

## 2022-12-22 RX ORDER — TOPIRAMATE 50 MG/1
50 TABLET, FILM COATED ORAL
Qty: 30 | Refills: 0 | Status: DISCONTINUED | COMMUNITY
Start: 2021-10-11 | End: 2022-12-22

## 2022-12-22 NOTE — HISTORY OF PRESENT ILLNESS
[FreeTextEntry1] : Follow up  [de-identified] : 26yo F presents for follow up on Mounjaro. Pt reports she is currently on 12.5mg weekly and is doing well. Pt repots significant weight loss with no side effects from medication. Pt reports she has been following up monthly for weight checks with the weight loss center but may want us to start prescribing her medication. Pt reports she is doing well otherwise but would like blood work checked due to being on the medication and hx of hepatitis. Pt denies no pain, nausea, vomiting, diarrhea or constipation.

## 2022-12-22 NOTE — PLAN
[FreeTextEntry1] : BW drawn today\par continue Monjaro 12.5mg \par F/U 3 months or sooner if needed

## 2022-12-27 ENCOUNTER — APPOINTMENT (OUTPATIENT)
Age: 27
End: 2022-12-27

## 2022-12-27 LAB
ALBUMIN SERPL ELPH-MCNC: 4.1 G/DL
ALP BLD-CCNC: 56 U/L
ALT SERPL-CCNC: 13 U/L
ANION GAP SERPL CALC-SCNC: 11 MMOL/L
AST SERPL-CCNC: 13 U/L
BASOPHILS # BLD AUTO: 0.02 K/UL
BASOPHILS NFR BLD AUTO: 0.4 %
BILIRUB SERPL-MCNC: 0.4 MG/DL
BUN SERPL-MCNC: 7 MG/DL
CALCIUM SERPL-MCNC: 9.2 MG/DL
CHLORIDE SERPL-SCNC: 104 MMOL/L
CHOLEST SERPL-MCNC: 205 MG/DL
CO2 SERPL-SCNC: 24 MMOL/L
CREAT SERPL-MCNC: 0.86 MG/DL
EGFR: 94 ML/MIN/1.73M2
EOSINOPHIL # BLD AUTO: 0.21 K/UL
EOSINOPHIL NFR BLD AUTO: 4.7 %
GLUCOSE SERPL-MCNC: 72 MG/DL
HCT VFR BLD CALC: 38.3 %
HDLC SERPL-MCNC: 50 MG/DL
HGB BLD-MCNC: 12.6 G/DL
IMM GRANULOCYTES NFR BLD AUTO: 0 %
LDLC SERPL CALC-MCNC: 129 MG/DL
LYMPHOCYTES # BLD AUTO: 1.25 K/UL
LYMPHOCYTES NFR BLD AUTO: 27.9 %
MAN DIFF?: NORMAL
MCHC RBC-ENTMCNC: 28.2 PG
MCHC RBC-ENTMCNC: 32.9 GM/DL
MCV RBC AUTO: 85.7 FL
MONOCYTES # BLD AUTO: 0.3 K/UL
MONOCYTES NFR BLD AUTO: 6.7 %
NEUTROPHILS # BLD AUTO: 2.7 K/UL
NEUTROPHILS NFR BLD AUTO: 60.3 %
NONHDLC SERPL-MCNC: 155 MG/DL
PLATELET # BLD AUTO: 265 K/UL
POTASSIUM SERPL-SCNC: 4.4 MMOL/L
PROT SERPL-MCNC: 6.5 G/DL
RBC # BLD: 4.47 M/UL
RBC # FLD: 13.2 %
SODIUM SERPL-SCNC: 139 MMOL/L
TRIGL SERPL-MCNC: 130 MG/DL
WBC # FLD AUTO: 4.48 K/UL

## 2023-01-16 ENCOUNTER — TRANSCRIPTION ENCOUNTER (OUTPATIENT)
Age: 28
End: 2023-01-16

## 2023-01-16 RX ORDER — PHENTERMINE HYDROCHLORIDE 37.5 MG/1
37.5 TABLET ORAL
Qty: 30 | Refills: 1 | Status: ACTIVE | COMMUNITY
Start: 2023-01-16 | End: 1900-01-01

## 2023-02-08 ENCOUNTER — TRANSCRIPTION ENCOUNTER (OUTPATIENT)
Age: 28
End: 2023-02-08

## 2023-02-21 ENCOUNTER — APPOINTMENT (OUTPATIENT)
Dept: OBGYN | Facility: CLINIC | Age: 28
End: 2023-02-21

## 2023-02-21 ENCOUNTER — APPOINTMENT (OUTPATIENT)
Dept: ANTEPARTUM | Facility: CLINIC | Age: 28
End: 2023-02-21

## 2023-03-28 ENCOUNTER — TRANSCRIPTION ENCOUNTER (OUTPATIENT)
Age: 28
End: 2023-03-28

## 2023-05-09 NOTE — PHYSICAL EXAM
LACTIC ACID 3.0 PER LAB, SANIYA SIM NOTIFIED.      Dona Ferrara RN  05/08/23 [id number] [No Acute Distress] : no acute distress [Normal Sclera/Conjunctiva] : normal sclera/conjunctiva [No Proptosis] : no proptosis [No Neck Mass] : no neck mass was observed [No LAD] : no lymphadenopathy [Thyroid Not Enlarged] : the thyroid was not enlarged [No Thyroid Nodules] : there were no palpable thyroid nodules [Normal Rate and Effort] : normal respiratory rhythm and effort [Normal Rate] : heart rate was normal  [Clear to Auscultation] : lungs were clear to auscultation bilaterally [Normal S1, S2] : normal S1 and S2 [Regular Rhythm] : with a regular rhythm [No Edema] : there was no peripheral edema [Murmurs] : no murmurs [Normal Gait] : normal gait [No Clubbing, Cyanosis] : no clubbing  or cyanosis of the fingernails [No Rash] : no rash [Acanthosis Nigricans] : no acanthosis nigricans [No Tremors] : no tremors [Normal Insight/Judgement] : insight and judgment were intact [Normal Affect] : the affect was normal [de-identified] : Obese female [Normal Mood] : the mood was normal [de-identified] : Biceps DTRs 2 + b/l

## 2023-05-22 ENCOUNTER — NON-APPOINTMENT (OUTPATIENT)
Age: 28
End: 2023-05-22

## 2023-05-23 ENCOUNTER — TRANSCRIPTION ENCOUNTER (OUTPATIENT)
Age: 28
End: 2023-05-23

## 2023-08-11 ENCOUNTER — APPOINTMENT (OUTPATIENT)
Dept: FAMILY MEDICINE | Facility: CLINIC | Age: 28
End: 2023-08-11
Payer: COMMERCIAL

## 2023-08-11 VITALS
WEIGHT: 200 LBS | OXYGEN SATURATION: 98 % | HEIGHT: 72 IN | DIASTOLIC BLOOD PRESSURE: 70 MMHG | BODY MASS INDEX: 27.09 KG/M2 | HEART RATE: 80 BPM | SYSTOLIC BLOOD PRESSURE: 110 MMHG

## 2023-08-11 DIAGNOSIS — R59.0 LOCALIZED ENLARGED LYMPH NODES: ICD-10-CM

## 2023-08-11 DIAGNOSIS — Z00.00 ENCOUNTER FOR GENERAL ADULT MEDICAL EXAMINATION W/OUT ABNORMAL FINDINGS: ICD-10-CM

## 2023-08-11 PROCEDURE — 99395 PREV VISIT EST AGE 18-39: CPT | Mod: 25

## 2023-08-11 PROCEDURE — 81003 URINALYSIS AUTO W/O SCOPE: CPT | Mod: QW

## 2023-08-11 PROCEDURE — 36415 COLL VENOUS BLD VENIPUNCTURE: CPT

## 2023-08-11 NOTE — PHYSICAL EXAM
[No Acute Distress] : no acute distress [Well Nourished] : well nourished [Well Developed] : well developed [Well-Appearing] : well-appearing [Normal Sclera/Conjunctiva] : normal sclera/conjunctiva [PERRL] : pupils equal round and reactive to light [EOMI] : extraocular movements intact [Normal Outer Ear/Nose] : the outer ears and nose were normal in appearance [Normal Oropharynx] : the oropharynx was normal [Supple] : supple [Thyroid Normal, No Nodules] : the thyroid was normal and there were no nodules present [No Respiratory Distress] : no respiratory distress  [No Accessory Muscle Use] : no accessory muscle use [Clear to Auscultation] : lungs were clear to auscultation bilaterally [Normal Rate] : normal rate  [Regular Rhythm] : with a regular rhythm [Normal S1, S2] : normal S1 and S2 [No Murmur] : no murmur heard [No Varicosities] : no varicosities [Pedal Pulses Present] : the pedal pulses are present [No Edema] : there was no peripheral edema [No Extremity Clubbing/Cyanosis] : no extremity clubbing/cyanosis [Soft] : abdomen soft [Non Tender] : non-tender [Non-distended] : non-distended [No Masses] : no abdominal mass palpated [Normal Posterior Cervical Nodes] : no posterior cervical lymphadenopathy [Normal Anterior Cervical Nodes] : no anterior cervical lymphadenopathy [No Spinal Tenderness] : no spinal tenderness [Grossly Normal Strength/Tone] : grossly normal strength/tone [No Rash] : no rash [Normal Gait] : normal gait [Normal Affect] : the affect was normal [Normal Insight/Judgement] : insight and judgment were intact [de-identified] : +left cervical LAD

## 2023-08-11 NOTE — PLAN
[FreeTextEntry1] : BW drawn today US H&N ordered Pt may want to increase mounjaro next month to 15mg  Encourage 150 minutes of physical activity a week Encouraged well balanced diet, low carbohydrates/fatty foods follow up 3 months or sooner if needed

## 2023-08-11 NOTE — HEALTH RISK ASSESSMENT
[Good] : ~his/her~  mood as  good [Yes] : Yes [2 - 4 times a month (2 pts)] : 2-4 times a month (2 points) [1 or 2 (0 pts)] : 1 or 2 (0 points) [Never (0 pts)] : Never (0 points) [No] : In the past 12 months have you used drugs other than those required for medical reasons? No [0] : 2) Feeling down, depressed, or hopeless: Not at all (0) [PHQ-2 Negative - No further assessment needed] : PHQ-2 Negative - No further assessment needed [I have developed a follow-up plan documented below in the note.] : I have developed a follow-up plan documented below in the note. [Patient reported PAP Smear was normal] : Patient reported PAP Smear was normal [None] : None [Alone] : lives alone [Employed] : employed [Significant Other] : lives with significant other [Sexually Active] : sexually active [Feels Safe at Home] : Feels safe at home [Fully functional (bathing, dressing, toileting, transferring, walking, feeding)] : Fully functional (bathing, dressing, toileting, transferring, walking, feeding) [Fully functional (using the telephone, shopping, preparing meals, housekeeping, doing laundry, using] : Fully functional and needs no help or supervision to perform IADLs (using the telephone, shopping, preparing meals, housekeeping, doing laundry, using transportation, managing medications and managing finances) [Never] : Never [Audit-CScore] : 2 [de-identified] : working out regularly [de-identified] : eating better  [AVU3Xlapy] : 0 [PapSmearDate] : 10/22

## 2023-08-11 NOTE — HISTORY OF PRESENT ILLNESS
[FreeTextEntry1] : CPE [de-identified] : 28yo F presents for CPE. Pt reports she is currently taking monjaro 12.5mg and tri-lo-sprintec. Pt is down almost 100lbs from her maximum weight and is doing well. Pt has no acute complaints today.  Pt denies any CP, palpitations, SOB, BATEMAN, fevers, chills, abdominal pain, N/V, diarrhea, constipation, BRBPR or dark tarry stools.

## 2023-08-14 ENCOUNTER — APPOINTMENT (OUTPATIENT)
Age: 28
End: 2023-08-14

## 2023-08-14 LAB
25(OH)D3 SERPL-MCNC: 44.7 NG/ML
ALBUMIN SERPL ELPH-MCNC: 4.2 G/DL
ALP BLD-CCNC: 53 U/L
ALT SERPL-CCNC: 16 U/L
ANION GAP SERPL CALC-SCNC: 11 MMOL/L
APPEARANCE: CLEAR
AST SERPL-CCNC: 19 U/L
BACTERIA: NEGATIVE /HPF
BILIRUB SERPL-MCNC: 0.3 MG/DL
BILIRUBIN URINE: NEGATIVE
BLOOD URINE: ABNORMAL
BUN SERPL-MCNC: 7 MG/DL
CALCIUM SERPL-MCNC: 9.7 MG/DL
CAST: 0 /LPF
CHLORIDE SERPL-SCNC: 107 MMOL/L
CHOLEST SERPL-MCNC: 205 MG/DL
CO2 SERPL-SCNC: 23 MMOL/L
COLOR: YELLOW
CREAT SERPL-MCNC: 0.77 MG/DL
EGFR: 107 ML/MIN/1.73M2
EPITHELIAL CELLS: 5 /HPF
ESTIMATED AVERAGE GLUCOSE: 85 MG/DL
GLUCOSE QUALITATIVE U: NEGATIVE MG/DL
GLUCOSE SERPL-MCNC: 87 MG/DL
HBA1C MFR BLD HPLC: 4.6 %
HDLC SERPL-MCNC: 57 MG/DL
KETONES URINE: NEGATIVE MG/DL
LDLC SERPL CALC-MCNC: 136 MG/DL
LEUKOCYTE ESTERASE URINE: NEGATIVE
MICROSCOPIC-UA: NORMAL
NITRITE URINE: NEGATIVE
NONHDLC SERPL-MCNC: 148 MG/DL
PH URINE: 6
POTASSIUM SERPL-SCNC: 4.9 MMOL/L
PROT SERPL-MCNC: 6.7 G/DL
PROTEIN URINE: NEGATIVE MG/DL
RED BLOOD CELLS URINE: 1 /HPF
SODIUM SERPL-SCNC: 141 MMOL/L
SPECIFIC GRAVITY URINE: 1.02
TRIGL SERPL-MCNC: 65 MG/DL
TSH SERPL-ACNC: 1.08 UIU/ML
URATE SERPL-MCNC: 4.5 MG/DL
UROBILINOGEN URINE: 0.2 MG/DL
WHITE BLOOD CELLS URINE: 1 /HPF

## 2023-08-17 ENCOUNTER — TRANSCRIPTION ENCOUNTER (OUTPATIENT)
Age: 28
End: 2023-08-17

## 2023-08-18 ENCOUNTER — TRANSCRIPTION ENCOUNTER (OUTPATIENT)
Age: 28
End: 2023-08-18

## 2023-08-18 RX ORDER — FLUTICASONE PROPIONATE 50 UG/1
50 SPRAY, METERED NASAL DAILY
Qty: 1 | Refills: 3 | Status: ACTIVE | COMMUNITY
Start: 2023-08-18 | End: 1900-01-01

## 2023-08-18 RX ORDER — METHYLPREDNISOLONE 4 MG/1
4 TABLET ORAL
Qty: 1 | Refills: 0 | Status: ACTIVE | COMMUNITY
Start: 2023-08-18 | End: 1900-01-01

## 2023-09-19 ENCOUNTER — TRANSCRIPTION ENCOUNTER (OUTPATIENT)
Age: 28
End: 2023-09-19

## 2023-12-07 ENCOUNTER — TRANSCRIPTION ENCOUNTER (OUTPATIENT)
Age: 28
End: 2023-12-07

## 2023-12-07 RX ORDER — TIRZEPATIDE 15 MG/.5ML
15 INJECTION, SOLUTION SUBCUTANEOUS
Qty: 3 | Refills: 0 | Status: ACTIVE | COMMUNITY
Start: 2023-01-16 | End: 1900-01-01

## 2023-12-11 NOTE — HISTORY OF PRESENT ILLNESS
Patients diabetes is well controlled with most recent A1c of 5.8% (Goal < 7%).   CONTINUE Mounjaro 7.5 mg weekly. Plan to increase to 10 mg weekly once finished with this dose (approx start date 12/31).   Sending Rx for 12.5 mg dose. Trying to fill ahead of schedule as patient is unsure of coverage come January.  In terms of coverage, he is currently past the donut hole in catastrophic coverage. He is getting conflicting information about coverage starting in the new year. Despite further inquiries it is unclear what he will have to pay in each phase as this is typically a non-formulary medication and is only covered because he previously tried trulicity, which is a formulary medication.  Compliance at present is estimated to be excellent.   Follow-up: 12/27 12pm   [FreeTextEntry1] :  Here to establish care for PCOS and obesity\par Quality:  PCOS  (diagnosed with irregular menses, cysts on ovaries \par Severity:  moderate\par Duration:  2 years \par Onset:  found in setting of irregular menses\par Associated Symptoms:  menses are regular now.  Does report some acne, but no hirsutism. \par Modifying factors:  No change in the past with OCP, but menses have since become regular.   Was intolerant to low dose extended release metformin (cramping and diarrhea) \par  \par Has struggled with Obesity for some time.  Was prescribed Contrave in the past and was able to lose about 20 pounds, but since then weight has stabilized at 280 pounds for the past 6 months now, despite working out at the gym 4 times a week.  Has never joined a formalized diet program, but planning on trying Weight [employer].  \par

## 2023-12-17 RX ORDER — NORGESTIMATE AND ETHINYL ESTRADIOL 7DAYSX3 LO
0.18/0.215/0.25 KIT ORAL
Qty: 3 | Refills: 0 | Status: ACTIVE | COMMUNITY
Start: 2020-08-27 | End: 1900-01-01

## 2023-12-27 ENCOUNTER — NON-APPOINTMENT (OUTPATIENT)
Age: 28
End: 2023-12-27

## 2023-12-27 ENCOUNTER — APPOINTMENT (OUTPATIENT)
Dept: OBGYN | Facility: CLINIC | Age: 28
End: 2023-12-27
Payer: COMMERCIAL

## 2023-12-27 VITALS
TEMPERATURE: 98 F | HEIGHT: 72 IN | SYSTOLIC BLOOD PRESSURE: 112 MMHG | BODY MASS INDEX: 26.68 KG/M2 | WEIGHT: 197 LBS | DIASTOLIC BLOOD PRESSURE: 72 MMHG

## 2023-12-27 DIAGNOSIS — Z01.419 ENCOUNTER FOR GYNECOLOGICAL EXAMINATION (GENERAL) (ROUTINE) W/OUT ABNORMAL FINDINGS: ICD-10-CM

## 2023-12-27 DIAGNOSIS — Z12.4 ENCOUNTER FOR SCREENING FOR MALIGNANT NEOPLASM OF CERVIX: ICD-10-CM

## 2023-12-27 DIAGNOSIS — N92.1 EXCESSIVE AND FREQUENT MENSTRUATION WITH IRREGULAR CYCLE: ICD-10-CM

## 2023-12-27 PROCEDURE — 99213 OFFICE O/P EST LOW 20 MIN: CPT | Mod: 25

## 2023-12-27 PROCEDURE — 99395 PREV VISIT EST AGE 18-39: CPT

## 2023-12-27 RX ORDER — DROSPIRENONE AND ETHINYL ESTRADIOL 0.02-3(28)
3-0.02 KIT ORAL
Qty: 3 | Refills: 3 | Status: ACTIVE | COMMUNITY
Start: 2023-12-27 | End: 1900-01-01

## 2023-12-27 NOTE — HISTORY OF PRESENT ILLNESS
[Oral Contraceptive] : uses oral contraception pills [Y] : Patient is sexually active [N] : Patient denies prior pregnancies [Menarche Age: ____] : age at menarche was [unfilled] [No] : Patient does not have concerns regarding sex [Currently Active] : currently active [Men] : men [TextBox_4] : Eufemia is here for an annual exam.  She was noted last dec. to have a hemorrhagic ovarian cyst and 2 mo follow up US was recommended. She felt fine and so she did not follow up.  She has had some btb towards the end of the active pills leading up to the placebo week over the last few months.  She has also noticed an increase in her acne in recent months.  she has lost a significant amount of weight in the last year and is doing great.   [PapSmeardate] : 10/13/22 [TextBox_31] : WNL [GonorrheaDate] : 10/13/22 [TextBox_63] : NEG [ChlamydiaDate] : 10/13/22 [TextBox_68] : NEG [LMPDate] : 12/26/23 [PGHxTotal] : 0 [FreeTextEntry1] : 12/26/23

## 2023-12-27 NOTE — PLAN
[facility] [id number] [facility] [place] [facility]  [address] [address]  Dept: [phone]  Dept Fax: [phone]     Marta Rodríguez is a 4 y.o. male who presents to the urgent care today for his medicalconditions/complaints as noted below. Marta Rodríguez is c/o of Cough (cough, sinus congestion )    HPI:      Sinusitis  This is a new problem. The current episode started today. The problem is unchanged. There has been no fever. Associated symptoms include congestion and coughing. Pertinent negatives include no chills, ear pain, headaches, sinus pressure, sneezing or sore throat. No past medical history on file. Current Outpatient Medications   Medication Sig Dispense Refill    guanFACINE HCl ER 3 MG TB24 GIVE 1 TABLET BY MOUTH EVERY DAY      cetirizine (ZYRTEC) 10 MG tablet Take 1 tablet by mouth daily 30 tablet 0    brompheniramine-pseudoephedrine-DM 2-30-10 MG/5ML syrup Take 5 mLs by mouth 3 times daily as needed for Cough 118 mL 0     No current facility-administered medications for this visit. No Known Allergies    Reviewed PMH, SH, and FH with the patient and updated. Subjective:      Review of Systems   Constitutional: Negative for chills, fatigue, fever and irritability. HENT: Positive for congestion and rhinorrhea. Negative for ear discharge, ear pain, postnasal drip, sinus pressure, sneezing and sore throat. Eyes: Negative for discharge and redness. Respiratory: Positive for cough. Negative for chest tightness, wheezing and stridor. Cardiovascular: Negative for chest pain. Musculoskeletal: Negative for myalgias. Skin: Negative for rash. Neurological: Negative for headaches. Hematological: Negative for adenopathy. Psychiatric/Behavioral: Negative. Objective:      Physical Exam  Vitals reviewed. Constitutional:       General: He is active. He is not in acute distress. Appearance: He is well-developed. He is not diaphoretic.    HENT: Head: Atraumatic. Right Ear: Tympanic membrane normal.      Left Ear: Tympanic membrane normal.      Nose: Nose normal.      Mouth/Throat:      Mouth: Mucous membranes are moist.      Pharynx: Posterior oropharyngeal erythema present. Eyes:      Conjunctiva/sclera: Conjunctivae normal.      Pupils: Pupils are equal, round, and reactive to light. Cardiovascular:      Rate and Rhythm: Normal rate and regular rhythm. Heart sounds: S1 normal and S2 normal. No murmur heard. Pulmonary:      Effort: Pulmonary effort is normal. No respiratory distress or retractions. Breath sounds: Normal breath sounds and air entry. No wheezing or rales. Abdominal:      General: Bowel sounds are normal. There is no distension. Palpations: Abdomen is soft. Tenderness: There is no abdominal tenderness. Musculoskeletal:         General: No tenderness, deformity or signs of injury. Normal range of motion. Cervical back: Normal range of motion. Skin:     General: Skin is warm. Findings: No rash. Neurological:      Mental Status: He is alert. Cranial Nerves: No cranial nerve deficit. Coordination: Coordination normal.       BP 91/54 (Site: Right Upper Arm, Position: Sitting, Cuff Size: Medium Adult)   Pulse 101   Temp 97.3 °F (36.3 °C) (Infrared)   Ht 4' 3.25\" (1.302 m)   Wt 64 lb (29 kg)   SpO2 98%   BMI 17.13 kg/m²     Assessment:       Diagnosis Orders   1. Rhinosinusitis  cetirizine (ZYRTEC) 10 MG tablet   2. Cough  brompheniramine-pseudoephedrine-DM 2-30-10 MG/5ML syrup     Plan:      I believe that this is likely a viral illness based on the physical exam findings. Zyrtec daily recommended. Bromfed as needed for the cough. Tylenol/Motrin for fever/discomfort. Patient agreeable to treatment plan. Educational materials provided on AVS.  Follow up if symptoms do not improve/worsen.     Orders Placed This Encounter   Medications    cetirizine (ZYRTEC) 10 MG tablet Sig: Take 1 tablet by mouth daily     Dispense:  30 tablet     Refill:  0    brompheniramine-pseudoephedrine-DM 2-30-10 MG/5ML syrup     Sig: Take 5 mLs by mouth 3 times daily as needed for Cough     Dispense:  118 mL     Refill:  0        Patient given educational materials - see patient instructions. Discussed use, benefit, and side effects of prescribed medications. All patientquestions answered. Pt voiced understanding.     Electronically signed by LACIE Burgos CNP on 5/24/2021at 6:23 PM [FreeTextEntry1] : pap guidelines reviewed- pt opting for a pap today declines gc/ct testing  switch from tri sprintec to jae.  monitor symptoms. if doing well, follow up in 1 year.  recommend pelvic US for cyst surveillance.  she states she feels fine and was frustrated with the repeat ultrasounds last year for the small cyst (that ended up becoming larger).  she states she will call if she has pain or continued irregular bleeding when switching the pill.  self breast exams encouragd  she was congratulated on her weight loss- doing great!

## 2023-12-27 NOTE — PHYSICAL EXAM
[Chaperone Present] : A chaperone was present in the examining room during all aspects of the physical examination [FreeTextEntry1] : GER[place] Judge [Appropriately responsive] : appropriately responsive [Alert] : alert [No Acute Distress] : no acute distress [Soft] : soft [Non-tender] : non-tender [Non-distended] : non-distended [No HSM] : No HSM [No Lesions] : no lesions [No Mass] : no mass [Oriented x3] : oriented x3 [Examination Of The Breasts] : a normal appearance [No Masses] : no breast masses were palpable [Labia Majora] : normal [Labia Minora] : normal [Normal] : normal [Uterine Adnexae] : normal

## 2023-12-31 PROBLEM — Z86.19 HISTORY OF SEXUALLY TRANSMITTED DISEASE: Status: RESOLVED | Noted: 2020-08-27 | Resolved: 2022-10-13

## 2024-01-05 LAB — CYTOLOGY CVX/VAG DOC THIN PREP: NORMAL

## 2024-04-22 ENCOUNTER — TRANSCRIPTION ENCOUNTER (OUTPATIENT)
Age: 29
End: 2024-04-22

## 2024-05-09 ENCOUNTER — APPOINTMENT (OUTPATIENT)
Dept: FAMILY MEDICINE | Facility: CLINIC | Age: 29
End: 2024-05-09

## 2024-06-24 ENCOUNTER — NON-APPOINTMENT (OUTPATIENT)
Age: 29
End: 2024-06-24

## 2024-06-30 ENCOUNTER — NON-APPOINTMENT (OUTPATIENT)
Age: 29
End: 2024-06-30

## 2024-07-17 ENCOUNTER — NON-APPOINTMENT (OUTPATIENT)
Age: 29
End: 2024-07-17

## 2024-07-25 ENCOUNTER — APPOINTMENT (OUTPATIENT)
Dept: ULTRASOUND IMAGING | Facility: CLINIC | Age: 29
End: 2024-07-25
Payer: COMMERCIAL

## 2024-07-25 PROCEDURE — 76536 US EXAM OF HEAD AND NECK: CPT

## 2024-07-29 ENCOUNTER — APPOINTMENT (OUTPATIENT)
Age: 29
End: 2024-07-29

## 2024-08-28 ENCOUNTER — APPOINTMENT (OUTPATIENT)
Dept: FAMILY MEDICINE | Facility: CLINIC | Age: 29
End: 2024-08-28
Payer: COMMERCIAL

## 2024-08-28 VITALS — SYSTOLIC BLOOD PRESSURE: 110 MMHG | DIASTOLIC BLOOD PRESSURE: 80 MMHG

## 2024-08-28 VITALS
WEIGHT: 196 LBS | BODY MASS INDEX: 26.55 KG/M2 | HEIGHT: 72 IN | OXYGEN SATURATION: 98 % | DIASTOLIC BLOOD PRESSURE: 70 MMHG | HEART RATE: 80 BPM | SYSTOLIC BLOOD PRESSURE: 110 MMHG

## 2024-08-28 DIAGNOSIS — E28.2 POLYCYSTIC OVARIAN SYNDROME: ICD-10-CM

## 2024-08-28 DIAGNOSIS — Z00.00 ENCOUNTER FOR GENERAL ADULT MEDICAL EXAMINATION W/OUT ABNORMAL FINDINGS: ICD-10-CM

## 2024-08-28 PROCEDURE — 99395 PREV VISIT EST AGE 18-39: CPT

## 2024-08-28 NOTE — HISTORY OF PRESENT ILLNESS
[FreeTextEntry1] : CPE [de-identified] : A 28-year-old female presents today for a CPE. Mood is good. Pt is currently taking Pt is followed by the gynecologist, Dr. Kim, denies any cystic issues. She is due back in December. Pt has noted a benign, palpable lymph node, denies any changes in size after recent screenings. Pt is stable at her weight, would like to go down to 175. She is currently on Mounjaro 15mg, taking a dosage every 2 weeks. Pt would like to start weaning off in 3 months, notes she would like to have kids soon. She is very happy with her progress; notes she had lost around 100 pounds.

## 2024-08-28 NOTE — HEALTH RISK ASSESSMENT
[Patient reported PAP Smear was abnormal] : Patient reported PAP Smear was abnormal [Good] : ~his/her~  mood as  good [Yes] : Yes [Monthly or less (1 pt)] : Monthly or less (1 point) [1 or 2 (0 pts)] : 1 or 2 (0 points) [Never (0 pts)] : Never (0 points) [No] : In the past 12 months have you used drugs other than those required for medical reasons? No [No falls in past year] : Patient reported no falls in the past year [0] : 2) Feeling down, depressed, or hopeless: Not at all (0) [PHQ-2 Negative - No further assessment needed] : PHQ-2 Negative - No further assessment needed [Never] : Never [None] : None [With Significant Other] : lives with significant other [Employed] : employed [Significant Other] : lives with significant other [Feels Safe at Home] : Feels safe at home [Fully functional (bathing, dressing, toileting, transferring, walking, feeding)] : Fully functional (bathing, dressing, toileting, transferring, walking, feeding) [Fully functional (using the telephone, shopping, preparing meals, housekeeping, doing laundry, using] : Fully functional and needs no help or supervision to perform IADLs (using the telephone, shopping, preparing meals, housekeeping, doing laundry, using transportation, managing medications and managing finances) [Reports normal functional visual acuity (ie: able to read med bottle)] : Reports normal functional visual acuity [Smoke Detector] : smoke detector [Audit-CScore] : 1 [de-identified] : She is exercising 6 days a week. [de-identified] : Regular hydration. Eats a normal, well-balanced diet.  [[id number]] : 0 [Change in mental status noted] : No change in mental status noted [Reports changes in hearing] : Reports no changes in hearing [Reports changes in vision] : Reports no changes in vision [Reports changes in dental health] : Reports no changes in dental health [PapSmearDate] : 12/23

## 2024-08-28 NOTE — ADDENDUM
[FreeTextEntry1] : This note was written by Tavares Donnelly, acting as the  for Dr. Bone. This note accurately reflects the work and decisions made by Dr. Bone.

## 2024-08-28 NOTE — PLAN
[FreeTextEntry1] : -General bloodwork done.   -Will plan on weaning off the Mounjaro medication. Will start in 3 months.   -Will follow-up with the patient PRN.

## 2024-08-28 NOTE — REVIEW OF SYSTEMS
Follow up with primary care in 2 days to discuss thyroid labs.   Also if not improving.   Follow up for stress test.   Return for worsening pain, difficulty breathing, or any other new concerns.      Discharge Instructions  Chest Pain    You have been seen today for chest pain or discomfort.  At this time, your doctor has found no signs that your chest pain is due to a serious or life-threatening condition, (or you have declined more testing and/or admission to the hospital). However, sometimes there is a serious problem that does not show up right away. Your evaluation today may not be complete and you may need further testing and evaluation.     You need to follow-up with your regular doctor within 3 days.    Return to the Emergency Department if:    Your chest pain changes, gets worse, starts to happen more often, or comes with less activity.    You are short of breath.    You get very weak or tired.    You pass out or faint.    You have any new symptoms, like fever, cough, numb legs, or you cough up blood.    You have anything else that worries you.    Until you follow-up with your regular doctor please do the following:    Take one aspirin daily unless you have an allergy or are told not to by your doctor.    If a stress test appointment has been made, go to the appointment.    If you have questions, contact your regular doctor.    If your doctor today has told you to follow-up with your regular doctor, it is very important that you make an appointment with your clinic and go to the appointment.  If you do not follow-up with your primary doctor, it may result in missing an important development which could result in permanent injury or disability and/or lasting pain.  If there is any problem keeping your appointment, call your doctor or return to the Emergency Department.    If you were given a prescription for medicine here today, be sure to read all of the information (including the package insert) that comes  with your prescription.  This will include important information about the medicine, its side effects, and any warnings that you need to know about.  The pharmacist who fills the prescription can provide more information and answer questions you may have about the medicine.  If you have questions or concerns that the pharmacist cannot address, please call or return to the Emergency Department.         Remember that you can always come back to the Emergency Department if you are not able to see your regular doctor in the amount of time listed above, if you get any new symptoms, or if there is anything that worries you.         [Negative] : Heme/Lymph

## 2024-08-29 ENCOUNTER — APPOINTMENT (OUTPATIENT)
Age: 29
End: 2024-08-29

## 2024-08-29 LAB
ALBUMIN SERPL ELPH-MCNC: 4 G/DL
ALP BLD-CCNC: 41 U/L
ALT SERPL-CCNC: 12 U/L
ANION GAP SERPL CALC-SCNC: 12 MMOL/L
APPEARANCE: CLEAR
AST SERPL-CCNC: 18 U/L
BACTERIA: ABNORMAL /HPF
BASOPHILS # BLD AUTO: 0.01 K/UL
BASOPHILS NFR BLD AUTO: 0.2 %
BILIRUB SERPL-MCNC: 0.4 MG/DL
BILIRUBIN URINE: NEGATIVE
BLOOD URINE: NEGATIVE
BUN SERPL-MCNC: 9 MG/DL
CALCIUM SERPL-MCNC: 9.2 MG/DL
CAST: 0 /LPF
CHLORIDE SERPL-SCNC: 105 MMOL/L
CHOLEST SERPL-MCNC: 180 MG/DL
CO2 SERPL-SCNC: 21 MMOL/L
COLOR: NORMAL
CREAT SERPL-MCNC: 0.75 MG/DL
EGFR: 110 ML/MIN/1.73M2
EOSINOPHIL # BLD AUTO: 0.14 K/UL
EOSINOPHIL NFR BLD AUTO: 2.9 %
EPITHELIAL CELLS: 8 /HPF
ESTIMATED AVERAGE GLUCOSE: 85 MG/DL
GLUCOSE QUALITATIVE U: NEGATIVE MG/DL
GLUCOSE SERPL-MCNC: 79 MG/DL
HBA1C MFR BLD HPLC: 4.6 %
HCT VFR BLD CALC: 38.9 %
HDLC SERPL-MCNC: 70 MG/DL
HGB BLD-MCNC: 12.3 G/DL
IMM GRANULOCYTES NFR BLD AUTO: 0.2 %
KETONES URINE: NEGATIVE MG/DL
LDLC SERPL CALC-MCNC: 95 MG/DL
LEUKOCYTE ESTERASE URINE: NEGATIVE
LYMPHOCYTES # BLD AUTO: 1.69 K/UL
LYMPHOCYTES NFR BLD AUTO: 35.5 %
MAN DIFF?: NORMAL
MCHC RBC-ENTMCNC: 28.3 PG
MCHC RBC-ENTMCNC: 31.6 GM/DL
MCV RBC AUTO: 89.6 FL
MICROSCOPIC-UA: NORMAL
MONOCYTES # BLD AUTO: 0.34 K/UL
MONOCYTES NFR BLD AUTO: 7.1 %
NEUTROPHILS # BLD AUTO: 2.57 K/UL
NEUTROPHILS NFR BLD AUTO: 54.1 %
NITRITE URINE: NEGATIVE
NONHDLC SERPL-MCNC: 110 MG/DL
PH URINE: 5.5
PLATELET # BLD AUTO: 246 K/UL
POTASSIUM SERPL-SCNC: 4.3 MMOL/L
PROT SERPL-MCNC: 6.4 G/DL
PROTEIN URINE: NEGATIVE MG/DL
RBC # BLD: 4.34 M/UL
RBC # FLD: 13.8 %
RED BLOOD CELLS URINE: 1 /HPF
SODIUM SERPL-SCNC: 138 MMOL/L
SPECIFIC GRAVITY URINE: 1.02
TRIGL SERPL-MCNC: 85 MG/DL
TSH SERPL-ACNC: 1.61 UIU/ML
URATE SERPL-MCNC: 4.2 MG/DL
UROBILINOGEN URINE: 0.2 MG/DL
WBC # FLD AUTO: 4.76 K/UL
WHITE BLOOD CELLS URINE: 1 /HPF

## 2024-09-03 NOTE — PROCEDURE
Continuity of Care Form    Patient Name: Frances Meek   :  1933  MRN:  [id number]    Admit date:  2024  Discharge date:  9/3/24    Code Status Order: [id number]   Advance Directives:   Advance Care Flowsheet Documentation             Admitting Physician:  Aaron Rand MD  PCP: Braydon Francis MD    Discharging Nurse: km  Discharging Hospital Unit/Room#: W173/W173-01  Discharging Unit Phone Number: [phone]    Emergency Contact:   Extended Emergency Contact Information  Primary Emergency Contact: Natty Jaime  Home Phone: [phone]  Relation: Child  Secondary Emergency Contact: SANJIV LYON  Mobile Phone: [phone]  Relation: Child    Past Surgical History:  History reviewed. No pertinent surgical history.    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    CKD (chronic kidney disease) stage 3, GFR 30-59 ml/min ([facility]) N18.30    Diverticulosis K57.90    Esophageal reflux K21.9    Essential hypertension I10    AMANDA (generalized anxiety disorder) F41.1    HLD (hyperlipidemia) E78.5    Mild episode of recurrent major depressive disorder (HCC) F33.0    Nonintractable headache R51.9    Obesity, Class I, BMI 30-34.9 E66.9    Primary osteoarthritis of right knee M17.11    Paroxysmal atrial fibrillation (HCC) I48.0    Psychophysiological insomnia F51.04    Vitamin D deficiency E55.9    Chronic combined systolic and diastolic heart failure (HCC) I50.42    Hypertensive heart disease with heart failure (HCC) I11.0    Long term (current) use of anticoagulants Z79.01    Thrombocytopenia, unspecified (HCC) D69.6    Acute diastolic (congestive)  [Cervical Pap Smear] : cervical Pap smear [Liquid Base] : liquid base [GC & Chlamydia via Pap] : GC & Chlamydia via Pap [Tolerated Well] : the patient tolerated the procedure well [No Complications] : there were no complications

## 2024-09-24 ENCOUNTER — TRANSCRIPTION ENCOUNTER (OUTPATIENT)
Age: 29
End: 2024-09-24

## 2024-09-24 DIAGNOSIS — N64.4 MASTODYNIA: ICD-10-CM

## 2024-10-01 ENCOUNTER — OUTPATIENT (OUTPATIENT)
Dept: OUTPATIENT SERVICES | Facility: HOSPITAL | Age: 29
LOS: 1 days | End: 2024-10-01
Payer: COMMERCIAL

## 2024-10-01 ENCOUNTER — APPOINTMENT (OUTPATIENT)
Dept: ULTRASOUND IMAGING | Facility: CLINIC | Age: 29
End: 2024-10-01
Payer: COMMERCIAL

## 2024-10-01 ENCOUNTER — RESULT REVIEW (OUTPATIENT)
Age: 29
End: 2024-10-01

## 2024-10-01 DIAGNOSIS — Z98.890 OTHER SPECIFIED POSTPROCEDURAL STATES: Chronic | ICD-10-CM

## 2024-10-01 DIAGNOSIS — N64.4 MASTODYNIA: ICD-10-CM

## 2024-10-01 DIAGNOSIS — Z00.8 ENCOUNTER FOR OTHER GENERAL EXAMINATION: ICD-10-CM

## 2024-10-01 PROCEDURE — 76641 ULTRASOUND BREAST COMPLETE: CPT | Mod: 26,LT

## 2024-10-01 PROCEDURE — 76641 ULTRASOUND BREAST COMPLETE: CPT

## 2024-10-15 ENCOUNTER — TRANSCRIPTION ENCOUNTER (OUTPATIENT)
Age: 29
End: 2024-10-15

## 2024-10-18 ENCOUNTER — APPOINTMENT (OUTPATIENT)
Dept: FAMILY MEDICINE | Facility: CLINIC | Age: 29
End: 2024-10-18
Payer: COMMERCIAL

## 2024-10-18 VITALS
HEART RATE: 90 BPM | OXYGEN SATURATION: 99 % | BODY MASS INDEX: 26.55 KG/M2 | HEIGHT: 72 IN | WEIGHT: 196 LBS | TEMPERATURE: 98.1 F | SYSTOLIC BLOOD PRESSURE: 122 MMHG | DIASTOLIC BLOOD PRESSURE: 78 MMHG

## 2024-10-18 DIAGNOSIS — M62.89 OTHER SPECIFIED DISORDERS OF MUSCLE: ICD-10-CM

## 2024-10-18 DIAGNOSIS — G58.9 MONONEUROPATHY, UNSPECIFIED: ICD-10-CM

## 2024-10-18 DIAGNOSIS — R91.1 SOLITARY PULMONARY NODULE: ICD-10-CM

## 2024-10-18 PROCEDURE — 99214 OFFICE O/P EST MOD 30 MIN: CPT

## 2024-10-18 RX ORDER — MELOXICAM 7.5 MG/1
7.5 TABLET ORAL
Qty: 60 | Refills: 0 | Status: ACTIVE | COMMUNITY
Start: 2024-10-18 | End: 1900-01-01

## 2024-10-18 RX ORDER — TIRZEPATIDE 12.5 MG/.5ML
12.5 INJECTION, SOLUTION SUBCUTANEOUS
Qty: 1 | Refills: 2 | Status: ACTIVE | COMMUNITY
Start: 2024-10-18 | End: 1900-01-01

## 2024-10-18 RX ORDER — GABAPENTIN 100 MG/1
100 CAPSULE ORAL 3 TIMES DAILY
Qty: 42 | Refills: 0 | Status: ACTIVE | COMMUNITY
Start: 2024-10-18 | End: 1900-01-01

## 2024-10-18 RX ORDER — CYCLOBENZAPRINE HYDROCHLORIDE 5 MG/1
5 TABLET, FILM COATED ORAL
Qty: 30 | Refills: 0 | Status: ACTIVE | COMMUNITY
Start: 2024-10-18 | End: 1900-01-01

## 2024-10-18 RX ORDER — DESOGESTREL AND ETHINYL ESTRADIOL 0.15-0.03
0.15-3 KIT ORAL DAILY
Qty: 4 | Refills: 0 | Status: ACTIVE | COMMUNITY
Start: 2024-10-18 | End: 1900-01-01

## 2024-10-22 ENCOUNTER — APPOINTMENT (OUTPATIENT)
Dept: FAMILY MEDICINE | Facility: CLINIC | Age: 29
End: 2024-10-22

## 2024-11-04 ENCOUNTER — OUTPATIENT (OUTPATIENT)
Dept: OUTPATIENT SERVICES | Facility: HOSPITAL | Age: 29
LOS: 1 days | End: 2024-11-04
Payer: COMMERCIAL

## 2024-11-04 ENCOUNTER — APPOINTMENT (OUTPATIENT)
Dept: RADIOLOGY | Facility: CLINIC | Age: 29
End: 2024-11-04

## 2024-11-04 DIAGNOSIS — Z98.890 OTHER SPECIFIED POSTPROCEDURAL STATES: Chronic | ICD-10-CM

## 2024-11-04 DIAGNOSIS — G58.9 MONONEUROPATHY, UNSPECIFIED: ICD-10-CM

## 2024-11-04 DIAGNOSIS — M62.89 OTHER SPECIFIED DISORDERS OF MUSCLE: ICD-10-CM

## 2024-11-04 PROCEDURE — 72040 X-RAY EXAM NECK SPINE 2-3 VW: CPT

## 2024-11-04 PROCEDURE — 72040 X-RAY EXAM NECK SPINE 2-3 VW: CPT | Mod: 26

## 2024-11-07 ENCOUNTER — APPOINTMENT (OUTPATIENT)
Age: 29
End: 2024-11-07

## 2024-11-07 ENCOUNTER — NON-APPOINTMENT (OUTPATIENT)
Age: 29
End: 2024-11-07

## 2024-11-14 ENCOUNTER — RX RENEWAL (OUTPATIENT)
Age: 29
End: 2024-11-14

## 2024-11-15 ENCOUNTER — APPOINTMENT (OUTPATIENT)
Dept: OTOLARYNGOLOGY | Facility: CLINIC | Age: 29
End: 2024-11-15

## 2024-11-15 VITALS
BODY MASS INDEX: 25.06 KG/M2 | HEART RATE: 95 BPM | SYSTOLIC BLOOD PRESSURE: 106 MMHG | WEIGHT: 185 LBS | DIASTOLIC BLOOD PRESSURE: 72 MMHG | TEMPERATURE: 96.6 F | HEIGHT: 72 IN

## 2024-11-15 DIAGNOSIS — J32.9 CHRONIC SINUSITIS, UNSPECIFIED: ICD-10-CM

## 2024-11-15 PROCEDURE — 31231 NASAL ENDOSCOPY DX: CPT | Mod: 52

## 2024-11-15 PROCEDURE — 99204 OFFICE O/P NEW MOD 45 MIN: CPT | Mod: 25

## 2024-11-15 RX ORDER — PREDNISONE 10 MG/1
10 TABLET ORAL
Qty: 15 | Refills: 0 | Status: ACTIVE | COMMUNITY
Start: 2024-11-15 | End: 1900-01-01

## 2024-11-15 RX ORDER — AMOXICILLIN AND CLAVULANATE POTASSIUM 875; 125 MG/1; MG/1
875-125 TABLET, COATED ORAL TWICE DAILY
Qty: 42 | Refills: 0 | Status: ACTIVE | COMMUNITY
Start: 2024-11-15 | End: 1900-01-01

## 2024-11-18 ENCOUNTER — TRANSCRIPTION ENCOUNTER (OUTPATIENT)
Age: 29
End: 2024-11-18

## 2024-11-26 ENCOUNTER — OUTPATIENT (OUTPATIENT)
Dept: OUTPATIENT SERVICES | Facility: HOSPITAL | Age: 29
LOS: 1 days | End: 2024-11-26
Payer: COMMERCIAL

## 2024-11-26 ENCOUNTER — TRANSCRIPTION ENCOUNTER (OUTPATIENT)
Age: 29
End: 2024-11-26

## 2024-11-26 ENCOUNTER — APPOINTMENT (OUTPATIENT)
Dept: CT IMAGING | Facility: CLINIC | Age: 29
End: 2024-11-26
Payer: COMMERCIAL

## 2024-11-26 DIAGNOSIS — Z98.890 OTHER SPECIFIED POSTPROCEDURAL STATES: Chronic | ICD-10-CM

## 2024-11-26 DIAGNOSIS — Z00.8 ENCOUNTER FOR OTHER GENERAL EXAMINATION: ICD-10-CM

## 2024-11-26 DIAGNOSIS — R91.1 SOLITARY PULMONARY NODULE: ICD-10-CM

## 2024-11-26 PROCEDURE — 71250 CT THORAX DX C-: CPT | Mod: 26

## 2024-11-26 PROCEDURE — 71250 CT THORAX DX C-: CPT

## 2024-11-29 PROBLEM — Z01.810 PREOP CARDIOVASCULAR EXAM: Status: RESOLVED | Noted: 2021-11-12 | Resolved: 2024-11-29

## 2024-11-29 PROBLEM — Z87.09 HISTORY OF ACUTE SINUSITIS: Status: RESOLVED | Noted: 2020-12-30 | Resolved: 2024-11-29

## 2024-11-29 PROBLEM — H69.92 DYSFUNCTION OF LEFT EUSTACHIAN TUBE: Status: RESOLVED | Noted: 2019-11-18 | Resolved: 2024-11-29

## 2024-11-29 PROBLEM — R51.9 CHRONIC FACIAL PAIN: Status: ACTIVE | Noted: 2024-11-29

## 2024-11-29 PROBLEM — Z01.818 PREOP EXAMINATION: Status: RESOLVED | Noted: 2021-11-09 | Resolved: 2024-11-29

## 2024-11-29 PROBLEM — Z13.21 ENCOUNTER FOR VITAMIN DEFICIENCY SCREENING: Status: RESOLVED | Noted: 2021-06-29 | Resolved: 2024-11-29

## 2024-11-29 PROBLEM — J30.9 ALLERGIC RHINITIS, UNSPECIFIED SEASONALITY, UNSPECIFIED TRIGGER: Status: ACTIVE | Noted: 2024-11-29

## 2024-11-29 PROBLEM — Z23 ENCOUNTER FOR IMMUNIZATION: Status: RESOLVED | Noted: 2021-08-05 | Resolved: 2024-11-29

## 2024-11-29 PROBLEM — Z92.29 HISTORY OF INFLUENZA VACCINATION: Status: RESOLVED | Noted: 2022-10-13 | Resolved: 2024-11-29

## 2024-11-29 PROBLEM — Z01.419 ENCOUNTER FOR WELL WOMAN EXAM WITH ROUTINE GYNECOLOGICAL EXAM: Status: RESOLVED | Noted: 2021-09-01 | Resolved: 2024-11-29

## 2024-12-03 ENCOUNTER — APPOINTMENT (OUTPATIENT)
Dept: ORTHOPEDIC SURGERY | Facility: CLINIC | Age: 29
End: 2024-12-03

## 2024-12-04 ENCOUNTER — APPOINTMENT (OUTPATIENT)
Dept: OBGYN | Facility: CLINIC | Age: 29
End: 2024-12-04
Payer: COMMERCIAL

## 2024-12-04 ENCOUNTER — APPOINTMENT (OUTPATIENT)
Dept: ANTEPARTUM | Facility: CLINIC | Age: 29
End: 2024-12-04
Payer: COMMERCIAL

## 2024-12-04 ENCOUNTER — ASOB RESULT (OUTPATIENT)
Age: 29
End: 2024-12-04

## 2024-12-04 VITALS
DIASTOLIC BLOOD PRESSURE: 72 MMHG | SYSTOLIC BLOOD PRESSURE: 106 MMHG | WEIGHT: 185 LBS | HEIGHT: 72 IN | BODY MASS INDEX: 25.06 KG/M2

## 2024-12-04 DIAGNOSIS — R35.0 FREQUENCY OF MICTURITION: ICD-10-CM

## 2024-12-04 DIAGNOSIS — R10.32 LEFT LOWER QUADRANT PAIN: ICD-10-CM

## 2024-12-04 DIAGNOSIS — N94.10 UNSPECIFIED DYSPAREUNIA: ICD-10-CM

## 2024-12-04 LAB
APPEARANCE: CLEAR
BILIRUBIN URINE: NEGATIVE
BLOOD URINE: NEGATIVE
COLOR: YELLOW
GLUCOSE QUALITATIVE U: NEGATIVE
KETONES URINE: NEGATIVE
LEUKOCYTE ESTERASE URINE: NEGATIVE
NITRITE URINE: NEGATIVE
PH URINE: 7
PROTEIN URINE: NEGATIVE
SPECIFIC GRAVITY URINE: <=1.005
UROBILINOGEN URINE: 0.2 (ref 0.2–?)

## 2024-12-04 PROCEDURE — 76830 TRANSVAGINAL US NON-OB: CPT

## 2024-12-04 PROCEDURE — 76857 US EXAM PELVIC LIMITED: CPT | Mod: 59

## 2024-12-04 PROCEDURE — 99213 OFFICE O/P EST LOW 20 MIN: CPT

## 2024-12-05 ENCOUNTER — NON-APPOINTMENT (OUTPATIENT)
Age: 29
End: 2024-12-05

## 2024-12-09 LAB — BACTERIA UR CULT: NORMAL

## 2024-12-11 ENCOUNTER — APPOINTMENT (OUTPATIENT)
Dept: OTOLARYNGOLOGY | Facility: CLINIC | Age: 29
End: 2024-12-11

## 2024-12-12 ENCOUNTER — RESULT REVIEW (OUTPATIENT)
Age: 29
End: 2024-12-12

## 2024-12-12 ENCOUNTER — APPOINTMENT (OUTPATIENT)
Dept: FAMILY MEDICINE | Facility: CLINIC | Age: 29
End: 2024-12-12

## 2024-12-12 ENCOUNTER — APPOINTMENT (OUTPATIENT)
Dept: GASTROENTEROLOGY | Facility: CLINIC | Age: 29
End: 2024-12-12
Payer: COMMERCIAL

## 2024-12-12 VITALS
TEMPERATURE: 95.6 F | WEIGHT: 191 LBS | HEART RATE: 97 BPM | BODY MASS INDEX: 25.87 KG/M2 | OXYGEN SATURATION: 99 % | SYSTOLIC BLOOD PRESSURE: 110 MMHG | DIASTOLIC BLOOD PRESSURE: 68 MMHG | HEIGHT: 72 IN

## 2024-12-12 DIAGNOSIS — R10.9 UNSPECIFIED ABDOMINAL PAIN: ICD-10-CM

## 2024-12-12 PROCEDURE — 99215 OFFICE O/P EST HI 40 MIN: CPT

## 2024-12-12 RX ORDER — POLYETHYLENE GLYCOL 3350 AND ELECTROLYTES WITH LEMON FLAVOR 236; 22.74; 6.74; 5.86; 2.97 G/4L; G/4L; G/4L; G/4L; G/4L
236 POWDER, FOR SOLUTION ORAL
Qty: 1 | Refills: 0 | Status: ACTIVE | COMMUNITY
Start: 2024-12-12 | End: 1900-01-01

## 2024-12-14 ENCOUNTER — NON-APPOINTMENT (OUTPATIENT)
Age: 29
End: 2024-12-14

## 2024-12-14 LAB
CRP SERPL-MCNC: <3 MG/L
TTG IGA SER IA-ACNC: 0.5 U/ML
TTG IGA SER-ACNC: NEGATIVE
TTG IGG SER IA-ACNC: <0.8 U/ML
TTG IGG SER IA-ACNC: NEGATIVE

## 2024-12-18 ENCOUNTER — APPOINTMENT (OUTPATIENT)
Dept: CT IMAGING | Facility: HOSPITAL | Age: 29
End: 2024-12-18
Payer: COMMERCIAL

## 2024-12-18 ENCOUNTER — APPOINTMENT (OUTPATIENT)
Dept: OTOLARYNGOLOGY | Facility: CLINIC | Age: 29
End: 2024-12-18

## 2024-12-18 ENCOUNTER — OUTPATIENT (OUTPATIENT)
Dept: OUTPATIENT SERVICES | Facility: HOSPITAL | Age: 29
LOS: 1 days | End: 2024-12-18
Payer: COMMERCIAL

## 2024-12-18 DIAGNOSIS — Z98.890 OTHER SPECIFIED POSTPROCEDURAL STATES: Chronic | ICD-10-CM

## 2024-12-18 PROCEDURE — 74177 CT ABD & PELVIS W/CONTRAST: CPT | Mod: 26

## 2024-12-18 PROCEDURE — 74177 CT ABD & PELVIS W/CONTRAST: CPT

## 2024-12-21 ENCOUNTER — NON-APPOINTMENT (OUTPATIENT)
Age: 29
End: 2024-12-21

## 2024-12-21 LAB — CALPROTECTIN FECAL: 7 UG/G

## 2024-12-30 ENCOUNTER — APPOINTMENT (OUTPATIENT)
Dept: OBGYN | Facility: CLINIC | Age: 29
End: 2024-12-30
Payer: COMMERCIAL

## 2024-12-30 VITALS
WEIGHT: 191 LBS | DIASTOLIC BLOOD PRESSURE: 62 MMHG | HEIGHT: 72 IN | SYSTOLIC BLOOD PRESSURE: 106 MMHG | BODY MASS INDEX: 25.87 KG/M2

## 2024-12-30 DIAGNOSIS — Z12.4 ENCOUNTER FOR SCREENING FOR MALIGNANT NEOPLASM OF CERVIX: ICD-10-CM

## 2024-12-30 DIAGNOSIS — Z01.419 ENCOUNTER FOR GYNECOLOGICAL EXAMINATION (GENERAL) (ROUTINE) W/OUT ABNORMAL FINDINGS: ICD-10-CM

## 2024-12-30 DIAGNOSIS — Z11.3 ENCOUNTER FOR SCREENING FOR INFECTIONS WITH A PREDOMINANTLY SEXUAL MODE OF TRANSMISSION: ICD-10-CM

## 2024-12-30 PROCEDURE — 99395 PREV VISIT EST AGE 18-39: CPT

## 2024-12-30 PROCEDURE — 99459 PELVIC EXAMINATION: CPT

## 2025-01-02 ENCOUNTER — RX RENEWAL (OUTPATIENT)
Age: 30
End: 2025-01-02

## 2025-01-02 LAB
C TRACH RRNA SPEC QL NAA+PROBE: NOT DETECTED
HPV HIGH+LOW RISK DNA PNL CVX: NOT DETECTED
N GONORRHOEA RRNA SPEC QL NAA+PROBE: NOT DETECTED
SOURCE TP AMPLIFICATION: NORMAL

## 2025-01-03 LAB — CYTOLOGY CVX/VAG DOC THIN PREP: NORMAL

## 2025-01-28 ENCOUNTER — TRANSCRIPTION ENCOUNTER (OUTPATIENT)
Age: 30
End: 2025-01-28

## 2025-01-30 ENCOUNTER — TRANSCRIPTION ENCOUNTER (OUTPATIENT)
Age: 30
End: 2025-01-30

## 2025-01-31 ENCOUNTER — TRANSCRIPTION ENCOUNTER (OUTPATIENT)
Age: 30
End: 2025-01-31

## 2025-02-04 ENCOUNTER — APPOINTMENT (OUTPATIENT)
Dept: ORTHOPEDIC SURGERY | Facility: CLINIC | Age: 30
End: 2025-02-04

## 2025-02-05 ENCOUNTER — APPOINTMENT (OUTPATIENT)
Dept: FAMILY MEDICINE | Facility: CLINIC | Age: 30
End: 2025-02-05

## 2025-02-28 ENCOUNTER — TRANSCRIPTION ENCOUNTER (OUTPATIENT)
Age: 30
End: 2025-02-28

## 2025-03-03 ENCOUNTER — TRANSCRIPTION ENCOUNTER (OUTPATIENT)
Age: 30
End: 2025-03-03

## 2025-03-04 ENCOUNTER — TRANSCRIPTION ENCOUNTER (OUTPATIENT)
Age: 30
End: 2025-03-04

## 2025-03-05 ENCOUNTER — TRANSCRIPTION ENCOUNTER (OUTPATIENT)
Age: 30
End: 2025-03-05

## 2025-03-07 ENCOUNTER — TRANSCRIPTION ENCOUNTER (OUTPATIENT)
Age: 30
End: 2025-03-07

## 2025-03-14 RX ORDER — TIRZEPATIDE 10 MG/.5ML
10 INJECTION, SOLUTION SUBCUTANEOUS
Qty: 1 | Refills: 2 | Status: ACTIVE | COMMUNITY
Start: 2025-03-05

## 2025-04-01 ENCOUNTER — APPOINTMENT (OUTPATIENT)
Dept: ORTHOPEDIC SURGERY | Facility: CLINIC | Age: 30
End: 2025-04-01

## 2025-04-01 ENCOUNTER — TRANSCRIPTION ENCOUNTER (OUTPATIENT)
Age: 30
End: 2025-04-01

## 2025-04-02 ENCOUNTER — APPOINTMENT (OUTPATIENT)
Dept: COLORECTAL SURGERY | Facility: CLINIC | Age: 30
End: 2025-04-02

## 2025-04-02 ENCOUNTER — APPOINTMENT (OUTPATIENT)
Dept: UROLOGY | Facility: CLINIC | Age: 30
End: 2025-04-02

## 2025-04-02 ENCOUNTER — NON-APPOINTMENT (OUTPATIENT)
Age: 30
End: 2025-04-02

## 2025-04-02 ENCOUNTER — TRANSCRIPTION ENCOUNTER (OUTPATIENT)
Age: 30
End: 2025-04-02

## 2025-04-03 ENCOUNTER — APPOINTMENT (OUTPATIENT)
Dept: SURGERY | Facility: CLINIC | Age: 30
End: 2025-04-03
Payer: COMMERCIAL

## 2025-04-03 ENCOUNTER — APPOINTMENT (OUTPATIENT)
Dept: ORTHOPEDIC SURGERY | Facility: CLINIC | Age: 30
End: 2025-04-03

## 2025-04-03 VITALS
BODY MASS INDEX: 25.47 KG/M2 | RESPIRATION RATE: 16 BRPM | HEIGHT: 72 IN | DIASTOLIC BLOOD PRESSURE: 65 MMHG | OXYGEN SATURATION: 100 % | TEMPERATURE: 97.6 F | WEIGHT: 188 LBS | HEART RATE: 78 BPM | SYSTOLIC BLOOD PRESSURE: 99 MMHG

## 2025-04-03 DIAGNOSIS — R10.9 UNSPECIFIED ABDOMINAL PAIN: ICD-10-CM

## 2025-04-03 PROCEDURE — 99205 OFFICE O/P NEW HI 60 MIN: CPT

## 2025-04-04 ENCOUNTER — APPOINTMENT (OUTPATIENT)
Dept: SURGERY | Facility: CLINIC | Age: 30
End: 2025-04-04

## 2025-05-12 ENCOUNTER — TRANSCRIPTION ENCOUNTER (OUTPATIENT)
Age: 30
End: 2025-05-12

## 2025-05-12 NOTE — ASU DISCHARGE PLAN (ADULT/PEDIATRIC) - ASU DISCHARGE DATE/TIME
Patient would like a stronger nasal spray and also a prescription for a allergy pill.    Chief Complaint   Patient presents with    Follow-up     Chronic disease       \"Have you been to the ER, urgent care clinic since your last visit?  Hospitalized since your last visit?\"    NO    “Have you seen or consulted any other health care providers outside our system since your last visit?”    NO           23-Jun-2022

## 2025-05-23 ENCOUNTER — APPOINTMENT (OUTPATIENT)
Age: 30
End: 2025-05-23
Payer: COMMERCIAL

## 2025-05-23 PROCEDURE — 45380 COLONOSCOPY AND BIOPSY: CPT

## 2025-06-12 ENCOUNTER — TRANSCRIPTION ENCOUNTER (OUTPATIENT)
Age: 30
End: 2025-06-12

## 2025-06-13 ENCOUNTER — TRANSCRIPTION ENCOUNTER (OUTPATIENT)
Age: 30
End: 2025-06-13

## 2025-07-15 ENCOUNTER — TRANSCRIPTION ENCOUNTER (OUTPATIENT)
Age: 30
End: 2025-07-15

## 2025-07-22 ENCOUNTER — APPOINTMENT (OUTPATIENT)
Dept: FAMILY MEDICINE | Facility: CLINIC | Age: 30
End: 2025-07-22
Payer: COMMERCIAL

## 2025-07-22 VITALS
DIASTOLIC BLOOD PRESSURE: 72 MMHG | BODY MASS INDEX: 24.65 KG/M2 | HEIGHT: 72 IN | HEART RATE: 75 BPM | WEIGHT: 182 LBS | OXYGEN SATURATION: 98 % | SYSTOLIC BLOOD PRESSURE: 110 MMHG

## 2025-07-22 DIAGNOSIS — F41.9 ANXIETY DISORDER, UNSPECIFIED: ICD-10-CM

## 2025-07-22 DIAGNOSIS — E66.9 OBESITY, UNSPECIFIED: ICD-10-CM

## 2025-07-22 PROCEDURE — 99214 OFFICE O/P EST MOD 30 MIN: CPT

## 2025-07-22 PROCEDURE — G2211 COMPLEX E/M VISIT ADD ON: CPT | Mod: NC

## 2025-07-22 RX ORDER — TIRZEPATIDE 7.5 MG/.5ML
7.5 INJECTION, SOLUTION SUBCUTANEOUS
Qty: 3 | Refills: 1 | Status: ACTIVE | COMMUNITY
Start: 2025-07-22 | End: 1900-01-01

## 2025-07-22 RX ORDER — ALPRAZOLAM 0.25 MG/1
0.25 TABLET ORAL
Qty: 20 | Refills: 0 | Status: ACTIVE | COMMUNITY
Start: 2025-07-22 | End: 1900-01-01

## 2025-07-23 PROBLEM — F41.9 ANXIETY WITH FLYING: Status: ACTIVE | Noted: 2025-07-22

## 2025-08-29 ENCOUNTER — APPOINTMENT (OUTPATIENT)
Dept: FAMILY MEDICINE | Facility: CLINIC | Age: 30
End: 2025-08-29

## 2025-08-29 VITALS
BODY MASS INDEX: 25.73 KG/M2 | OXYGEN SATURATION: 98 % | HEIGHT: 72 IN | HEART RATE: 80 BPM | DIASTOLIC BLOOD PRESSURE: 70 MMHG | SYSTOLIC BLOOD PRESSURE: 110 MMHG | WEIGHT: 190 LBS

## 2025-08-29 DIAGNOSIS — Z00.00 ENCOUNTER FOR GENERAL ADULT MEDICAL EXAMINATION W/OUT ABNORMAL FINDINGS: ICD-10-CM

## 2025-08-29 DIAGNOSIS — R59.0 LOCALIZED ENLARGED LYMPH NODES: ICD-10-CM

## 2025-08-29 PROCEDURE — 99395 PREV VISIT EST AGE 18-39: CPT

## 2025-09-02 ENCOUNTER — APPOINTMENT (OUTPATIENT)
Age: 30
End: 2025-09-02

## 2025-09-02 LAB
ALBUMIN SERPL ELPH-MCNC: 4.3 G/DL
ALP BLD-CCNC: 56 U/L
ALT SERPL-CCNC: 22 U/L
ANION GAP SERPL CALC-SCNC: 11 MMOL/L
APPEARANCE: CLEAR
AST SERPL-CCNC: 20 U/L
BACTERIA: NEGATIVE /HPF
BASOPHILS # BLD AUTO: 0.03 K/UL
BASOPHILS NFR BLD AUTO: 0.7 %
BILIRUB SERPL-MCNC: 0.5 MG/DL
BILIRUBIN URINE: NEGATIVE
BLOOD URINE: NEGATIVE
BUN SERPL-MCNC: 8 MG/DL
CALCIUM SERPL-MCNC: 9 MG/DL
CAST: 0 /LPF
CHLORIDE SERPL-SCNC: 106 MMOL/L
CHOLEST SERPL-MCNC: 151 MG/DL
CO2 SERPL-SCNC: 21 MMOL/L
COLOR: YELLOW
CREAT SERPL-MCNC: 0.63 MG/DL
EGFRCR SERPLBLD CKD-EPI 2021: 122 ML/MIN/1.73M2
EOSINOPHIL # BLD AUTO: 0.14 K/UL
EOSINOPHIL NFR BLD AUTO: 3.3 %
EPITHELIAL CELLS: 1 /HPF
ESTIMATED AVERAGE GLUCOSE: 88 MG/DL
GLUCOSE QUALITATIVE U: NEGATIVE MG/DL
GLUCOSE SERPL-MCNC: 94 MG/DL
HBA1C MFR BLD HPLC: 4.7 %
HCT VFR BLD CALC: 37.1 %
HDLC SERPL-MCNC: 59 MG/DL
HGB BLD-MCNC: 12.3 G/DL
IMM GRANULOCYTES NFR BLD AUTO: 0.2 %
KETONES URINE: NEGATIVE MG/DL
LDLC SERPL-MCNC: 83 MG/DL
LEUKOCYTE ESTERASE URINE: NEGATIVE
LYMPHOCYTES # BLD AUTO: 1.58 K/UL
LYMPHOCYTES NFR BLD AUTO: 37.8 %
MAN DIFF?: NORMAL
MCHC RBC-ENTMCNC: 28.6 PG
MCHC RBC-ENTMCNC: 33.2 G/DL
MCV RBC AUTO: 86.3 FL
MICROSCOPIC-UA: NORMAL
MONOCYTES # BLD AUTO: 0.36 K/UL
MONOCYTES NFR BLD AUTO: 8.6 %
NEUTROPHILS # BLD AUTO: 2.06 K/UL
NEUTROPHILS NFR BLD AUTO: 49.4 %
NITRITE URINE: NEGATIVE
NONHDLC SERPL-MCNC: 92 MG/DL
PH URINE: 6.5
PLATELET # BLD AUTO: 224 K/UL
POTASSIUM SERPL-SCNC: 4.4 MMOL/L
PROT SERPL-MCNC: 6.9 G/DL
PROTEIN URINE: NEGATIVE MG/DL
RBC # BLD: 4.3 M/UL
RBC # FLD: 13.1 %
RED BLOOD CELLS URINE: 0 /HPF
SODIUM SERPL-SCNC: 138 MMOL/L
SPECIFIC GRAVITY URINE: 1
TRIGL SERPL-MCNC: 40 MG/DL
TSH SERPL-ACNC: 1.34 UIU/ML
URATE SERPL-MCNC: 3.2 MG/DL
UROBILINOGEN URINE: 0.2 MG/DL
WBC # FLD AUTO: 4.18 K/UL
WHITE BLOOD CELLS URINE: 1 /HPF

## 2025-09-15 ENCOUNTER — APPOINTMENT (OUTPATIENT)
Dept: ULTRASOUND IMAGING | Facility: CLINIC | Age: 30
End: 2025-09-15
Payer: COMMERCIAL

## 2025-09-15 DIAGNOSIS — Q89.2 CONGENITAL MALFORMATIONS OF OTHER ENDOCRINE GLANDS: ICD-10-CM

## 2025-09-15 PROCEDURE — 76536 US EXAM OF HEAD AND NECK: CPT | Mod: 26

## 2025-09-18 ENCOUNTER — TRANSCRIPTION ENCOUNTER (OUTPATIENT)
Age: 30
End: 2025-09-18